# Patient Record
Sex: FEMALE | Race: WHITE | NOT HISPANIC OR LATINO | Employment: PART TIME | URBAN - METROPOLITAN AREA
[De-identification: names, ages, dates, MRNs, and addresses within clinical notes are randomized per-mention and may not be internally consistent; named-entity substitution may affect disease eponyms.]

---

## 2019-04-19 ENCOUNTER — OFFICE VISIT (OUTPATIENT)
Dept: INTERNAL MEDICINE | Facility: CLINIC | Age: 34
End: 2019-04-19

## 2019-04-19 VITALS
SYSTOLIC BLOOD PRESSURE: 120 MMHG | TEMPERATURE: 99 F | DIASTOLIC BLOOD PRESSURE: 62 MMHG | HEIGHT: 67 IN | WEIGHT: 129 LBS | BODY MASS INDEX: 20.25 KG/M2 | HEART RATE: 60 BPM

## 2019-04-19 DIAGNOSIS — J02.0 STREP PHARYNGITIS: Primary | ICD-10-CM

## 2019-04-19 PROCEDURE — 99213 OFFICE O/P EST LOW 20 MIN: CPT | Performed by: PHYSICIAN ASSISTANT

## 2019-04-19 RX ORDER — AZITHROMYCIN 250 MG/1
TABLET, FILM COATED ORAL
Qty: 6 TABLET | Refills: 0 | Status: SHIPPED | OUTPATIENT
Start: 2019-04-19 | End: 2019-05-16

## 2019-04-19 NOTE — PATIENT INSTRUCTIONS
Continue amoxicillin 875 mg twice a day.  If symptoms are not starting to improve or there is increased chest congestion in the next 2-3 days she will stop the amoxicillin and start the Z-Tony.  She was advised not to work until she has been afebrile for 24 hours.  She is to call next week if her symptoms are not resolving.

## 2019-04-19 NOTE — PROGRESS NOTES
"Patient Care Team:  David Zaldivar MD as PCP - General (Internal Medicine)    Chief Complaint;:   Chief Complaint   Patient presents with   • Sore Throat     Patient was diagnoised at [facility] Tuesday   • Cough     Productive   • Fever     100 last night   • Chills     Flu was negative        Subjective     HPI  34-year-old breast-feeding white female presents to the office today with persistent sore throat, nonproductive cough and chest congestion.  She was diagnosed 2 days ago with strep pharyngitis and given amoxicillin 875 mg twice a day.  She continues to run a fever and is still not feeling well.  History reviewed. No pertinent past medical history.    Social History     Socioeconomic History   • Marital status:      Spouse name: Not on file   • Number of children: Not on file   • Years of education: Not on file   • Highest education level: Not on file   Tobacco Use   • Smoking status: Never Smoker   Substance and Sexual Activity   • Alcohol use: No   • Drug use: No   • Sexual activity: Yes     Partners: Male       Allergies   Allergen Reactions   • Cefprozil Hives       Review of Systems:     Review of Systems   Constitutional: Negative.    HENT: Positive for congestion and sore throat.    Respiratory: Positive for cough.    Cardiovascular: Negative.        Vital Signs  Vitals:    04/19/19 1109   BP: 120/62   BP Location: Left arm   Patient Position: Sitting   Cuff Size: Adult   Pulse: 60   Temp: 99 °F (37.2 °C)   TempSrc: Temporal   Weight: 58.5 kg (129 lb)   Height: 170.2 cm (67\")         Current Outpatient Medications:   •  amoxicillin (AMOXIL) 875 MG tablet, Take 1 tablet by mouth 2 (Two) Times a Day., Disp: 20 tablet, Rfl: 0  •  Prenatal Vit-Fe Fumarate-FA (PRENATAL VITAMIN PO), Take  by mouth., Disp: , Rfl:   •  azithromycin (ZITHROMAX Z-TONJA) 250 MG tablet, Take 2 tablets the first day, then 1 tablet daily for 4 days., Disp: 6 tablet, Rfl: 0    Physical Exam:    Physical Exam   Constitutional: " She is oriented to person, place, and time. She appears well-developed and well-nourished.   HENT:   Head: Normocephalic and atraumatic.   Mouth/Throat: Oropharynx is clear and moist.   Neck: Normal range of motion. Neck supple.   Cardiovascular: Normal rate, regular rhythm and normal heart sounds.   Pulmonary/Chest: Effort normal and breath sounds normal.   Lymphadenopathy:     She has cervical adenopathy.   Neurological: She is alert and oriented to person, place, and time.   Nursing note and vitals reviewed.      Procedures      Assessment/Plan   Problem List Items Addressed This Visit     None      Visit Diagnoses     Strep pharyngitis    -  Primary    Relevant Medications    azithromycin (ZITHROMAX Z-TONJA) 250 MG tablet        Patient Instructions   Continue amoxicillin 875 mg twice a day.  If symptoms are not starting to improve or there is increased chest congestion in the next 2-3 days she will stop the amoxicillin and start the Z-Tonja.  She was advised not to work until she has been afebrile for 24 hours.  She is to call next week if her symptoms are not resolving.      Plan of care reviewed with patient at the conclusion of today's visit. Education was provided regarding diagnosis, management, and any prescribed or recommended OTC medications.Patient verbalizes understanding of and agreement with management plan.     Morenita Tomlin PA-C

## 2019-05-01 PROBLEM — B96.89 ACUTE BACTERIAL SINUSITIS: Status: ACTIVE | Noted: 2019-05-01

## 2019-05-01 PROBLEM — I47.9 PAROXYSMAL TACHYCARDIA, UNSPECIFIED: Status: ACTIVE | Noted: 2019-05-01

## 2019-05-01 PROBLEM — J01.90 ACUTE BACTERIAL SINUSITIS: Status: ACTIVE | Noted: 2019-05-01

## 2019-05-01 PROBLEM — J30.9 ALLERGIC RHINITIS: Status: ACTIVE | Noted: 2019-05-01

## 2019-05-01 PROBLEM — K58.9 IRRITABLE BOWEL SYNDROME: Status: ACTIVE | Noted: 2019-05-01

## 2019-05-01 PROBLEM — Z00.00 ANNUAL PHYSICAL EXAM: Status: ACTIVE | Noted: 2019-05-01

## 2019-05-16 ENCOUNTER — OFFICE VISIT (OUTPATIENT)
Dept: INTERNAL MEDICINE | Facility: CLINIC | Age: 34
End: 2019-05-16

## 2019-05-16 VITALS
WEIGHT: 124 LBS | HEART RATE: 72 BPM | DIASTOLIC BLOOD PRESSURE: 64 MMHG | BODY MASS INDEX: 19.46 KG/M2 | HEIGHT: 67 IN | SYSTOLIC BLOOD PRESSURE: 122 MMHG

## 2019-05-16 DIAGNOSIS — Z00.00 ANNUAL PHYSICAL EXAM: Primary | ICD-10-CM

## 2019-05-16 DIAGNOSIS — J30.1 SEASONAL ALLERGIC RHINITIS DUE TO POLLEN: ICD-10-CM

## 2019-05-16 DIAGNOSIS — Z13.228 SCREENING FOR ENDOCRINE, METABOLIC AND IMMUNITY DISORDER: ICD-10-CM

## 2019-05-16 DIAGNOSIS — Z13.0 SCREENING FOR ENDOCRINE, METABOLIC AND IMMUNITY DISORDER: ICD-10-CM

## 2019-05-16 DIAGNOSIS — Z13.29 SCREENING FOR ENDOCRINE, METABOLIC AND IMMUNITY DISORDER: ICD-10-CM

## 2019-05-16 DIAGNOSIS — K58.2 IRRITABLE BOWEL SYNDROME WITH BOTH CONSTIPATION AND DIARRHEA: ICD-10-CM

## 2019-05-16 DIAGNOSIS — Z13.220 LIPID SCREENING: ICD-10-CM

## 2019-05-16 PROCEDURE — 99395 PREV VISIT EST AGE 18-39: CPT | Performed by: INTERNAL MEDICINE

## 2019-05-16 NOTE — PROGRESS NOTES
[facility]     Latosha Romero  1985   [id number]      Patient Care Team:  David Zaldivar MD as PCP - General (Internal Medicine)    Chief Complaint::   Chief Complaint   Patient presents with   • Annual Exam        HPI  Latosha is now 34.  She comes in for annual examination.  She is now working part-time as a nurse at the [employer].  She has 2 children.  Other than fatigue, which she attributes to a  and an older child, which often results in loss of sleep, she has no complaints.  Her allergy symptoms are controlled and in general her IBS symptoms have been very well controlled.  She was followed closely through her most recent pregnancy by her obstetrician.  Her strength and stamina are good.    Chronic Conditions:      Patient Active Problem List   Diagnosis   • Irritable bowel syndrome   • Allergic rhinitis   • Annual physical exam   • Paroxysmal tachycardia, unspecified (CMS/HCC)        Past Medical History:   Diagnosis Date   • Scoliosis        Past Surgical History:   Procedure Laterality Date   • NOSE SURGERY     • TONSILLECTOMY         Family History   Problem Relation Age of Onset   • Depression Father    • Hypertension Father    • Hyperlipidemia Father    • Diabetes Paternal Grandfather        Social History     Socioeconomic History   • Marital status:      Spouse name: Not on file   • Number of children: Not on file   • Years of education: Not on file   • Highest education level: Not on file   Tobacco Use   • Smoking status: Never Smoker   Substance and Sexual Activity   • Alcohol use: No   • Drug use: No   • Sexual activity: Yes     Partners: Male       Allergies   Allergen Reactions   • Cefprozil Hives         Current Outpatient Medications:   •  Prenatal Vit-Fe Fumarate-FA (PRENATAL VITAMIN PO), Take  by mouth., Disp: , Rfl:     Immunization History   Administered Date(s) Administered   • Flu Vaccine Quad PF >36MO 10/18/2017, 10/16/2018   •  "Pneumococcal Polysaccharide (PPSV23) 09/28/2017        Health Maintenance Due   Topic Date Due   • TDAP/TD VACCINES (1 - Tdap) 01/23/2004   • PAP SMEAR  04/19/2017   • ANNUAL PHYSICAL  01/26/2019        Review of Systems   Constitutional: Negative for chills, fatigue and fever.   HENT: Negative for congestion, ear pain and sinus pressure.    Respiratory: Negative for cough, chest tightness, shortness of breath and wheezing.    Cardiovascular: Negative for chest pain and palpitations.   Gastrointestinal: Negative for abdominal pain, blood in stool and constipation.   Skin: Negative for color change.   Allergic/Immunologic: Negative for environmental allergies.   Neurological: Negative for dizziness, speech difficulty and headache.   Psychiatric/Behavioral: Negative for decreased concentration. The patient is not nervous/anxious.         Vital Signs  Vitals:    05/16/19 1417   BP: 122/64   BP Location: Left arm   Patient Position: Sitting   Cuff Size: Adult   Pulse: 72   Weight: 56.2 kg (124 lb)   Height: 170.2 cm (67.01\")       Physical Exam   Constitutional: She is oriented to person, place, and time. She appears well-developed and well-nourished.   HENT:   Head: Normocephalic and atraumatic.   Right Ear: External ear normal.   Left Ear: External ear normal.   Nose: Nose normal.   Mouth/Throat: Oropharynx is clear and moist. No oropharyngeal exudate.   Eyes: Conjunctivae and EOM are normal. Pupils are equal, round, and reactive to light.   Neck: Normal range of motion. Neck supple. No JVD present. No thyromegaly present.   Cardiovascular: Normal rate, regular rhythm, normal heart sounds and intact distal pulses. Exam reveals no gallop and no friction rub.   No murmur heard.  Pulmonary/Chest: Effort normal and breath sounds normal. No respiratory distress. She has no wheezes. She has no rales. She exhibits no tenderness.   Abdominal: Soft. Bowel sounds are normal. She exhibits no distension and no mass. There is no " tenderness. There is no rebound and no guarding. No hernia.   Musculoskeletal: Normal range of motion. She exhibits no edema or tenderness.   Lymphadenopathy:     She has no cervical adenopathy.   Neurological: She is alert and oriented to person, place, and time. She displays normal reflexes. No cranial nerve deficit or sensory deficit. She exhibits normal muscle tone. Coordination normal.   Skin: Skin is warm and dry. No rash noted. No erythema.   Psychiatric: She has a normal mood and affect. Her behavior is normal. Judgment and thought content normal.   Nursing note and vitals reviewed.     Procedures    ACE III MINI             Assessment/Plan:    Latosha was seen today for annual exam.    Diagnoses and all orders for this visit:    Annual physical exam    Lipid screening  -     Lipid Panel; Future    Screening for endocrine, metabolic and immunity disorder  -     Comprehensive Metabolic Panel; Future    Irritable bowel syndrome with both constipation and diarrhea    Seasonal allergic rhinitis due to pollen    She remains in excellent health with good lifestyle habits.  She is encouraged to continue working on maintaining physical strength and healthy diet.  She is up-to-date on GYN care.  She will return for lipid screening in the next few days and then will follow-up in the next year or 2 for a preventive examination.        Labs  Results for orders placed or performed during the hospital encounter of 04/16/19   POCT Influenza A/B   Result Value Ref Range    Rapid Influenza A Ag Negative Negative    Rapid Influenza B Ag Negative Negative    Internal Control Passed Passed    Lot Number [id number]     Expiration Date 04/30/2021    POCT Rapid Strep A   Result Value Ref Range    Rapid Strep A Screen Positive (A) Negative, VALID, INVALID, Not Performed    Internal Control Passed Passed    Lot Number [id number]     Expiration Date 10/14/2021         Counseling was given to patient for the following topics: appropriate  exercise 150 minutes per week, bone health including calcium and vitamin D, disease prevention and healthy eating habits.    Plan of care reviewed with patient at the conclusion of today's visit. Education was provided regarding diagnosis, management, and any prescribed or recommended OTC medications.Patient verbalizes understanding of and agreement with management plan.         David Zaldivar MD

## 2019-05-23 PROBLEM — J02.9 PHARYNGITIS: Status: ACTIVE | Noted: 2019-05-23

## 2019-09-30 PROBLEM — N61.0 MASTITIS, LEFT, ACUTE: Status: ACTIVE | Noted: 2019-09-30

## 2019-10-02 ENCOUNTER — TELEPHONE (OUTPATIENT)
Dept: INTERNAL MEDICINE | Facility: CLINIC | Age: 34
End: 2019-10-02

## 2019-10-02 NOTE — TELEPHONE ENCOUNTER
Pt verbalized understanding. She prefers to see you and she has an appt tomorrow morning with you @ 7:45am.

## 2019-10-02 NOTE — TELEPHONE ENCOUNTER
Pt called stating she is 8 mos post partem and has mastitis she started taking Augmentin on Monday and she has a fever running between 100- 101   Her ob office told her to follow up with her primary care provider   Wants to know if you think she should be on different antibiotic

## 2019-10-02 NOTE — TELEPHONE ENCOUNTER
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  Unfortunately, doxycycline and bactrim, which are the treatments, are used \"with caution\" in pregnancy.  If it a sensitive staph, it would respond to keflex, which is much safer.  It's very difficult to get a culture, particularly when she has already been on antibiotic.  Is there any way Morenita or Lynn could see her today?  "

## 2019-10-03 ENCOUNTER — OFFICE VISIT (OUTPATIENT)
Dept: INTERNAL MEDICINE | Facility: CLINIC | Age: 34
End: 2019-10-03

## 2019-10-03 ENCOUNTER — LAB (OUTPATIENT)
Dept: LAB | Facility: HOSPITAL | Age: 34
End: 2019-10-03

## 2019-10-03 VITALS
HEART RATE: 72 BPM | HEIGHT: 67 IN | WEIGHT: 113 LBS | SYSTOLIC BLOOD PRESSURE: 124 MMHG | DIASTOLIC BLOOD PRESSURE: 80 MMHG | BODY MASS INDEX: 17.74 KG/M2 | TEMPERATURE: 98.8 F

## 2019-10-03 DIAGNOSIS — N61.0 MASTITIS: ICD-10-CM

## 2019-10-03 DIAGNOSIS — Z13.0 SCREENING FOR ENDOCRINE, METABOLIC AND IMMUNITY DISORDER: ICD-10-CM

## 2019-10-03 DIAGNOSIS — Z13.220 LIPID SCREENING: ICD-10-CM

## 2019-10-03 DIAGNOSIS — N61.0 MASTITIS: Primary | ICD-10-CM

## 2019-10-03 DIAGNOSIS — Z13.228 SCREENING FOR ENDOCRINE, METABOLIC AND IMMUNITY DISORDER: ICD-10-CM

## 2019-10-03 DIAGNOSIS — Z13.29 SCREENING FOR ENDOCRINE, METABOLIC AND IMMUNITY DISORDER: ICD-10-CM

## 2019-10-03 LAB
ALBUMIN SERPL-MCNC: 4.5 G/DL (ref 3.5–5.2)
ALBUMIN/GLOB SERPL: 1.7 G/DL
ALP SERPL-CCNC: 90 U/L (ref 39–117)
ALT SERPL W P-5'-P-CCNC: 14 U/L (ref 1–33)
ANION GAP SERPL CALCULATED.3IONS-SCNC: 15.8 MMOL/L (ref 5–15)
AST SERPL-CCNC: 16 U/L (ref 1–32)
BASOPHILS # BLD MANUAL: 0.06 10*3/MM3 (ref 0–0.2)
BASOPHILS NFR BLD AUTO: 1 % (ref 0–1.5)
BILIRUB SERPL-MCNC: 0.3 MG/DL (ref 0.2–1.2)
BUN BLD-MCNC: 12 MG/DL (ref 6–20)
BUN/CREAT SERPL: 19.7 (ref 7–25)
CALCIUM SPEC-SCNC: 9.6 MG/DL (ref 8.6–10.5)
CHLORIDE SERPL-SCNC: 106 MMOL/L (ref 98–107)
CHOLEST SERPL-MCNC: 125 MG/DL (ref 0–200)
CO2 SERPL-SCNC: 26.2 MMOL/L (ref 22–29)
CREAT BLD-MCNC: 0.61 MG/DL (ref 0.57–1)
DEPRECATED RDW RBC AUTO: 39.8 FL (ref 37–54)
EOSINOPHIL # BLD MANUAL: 0.29 10*3/MM3 (ref 0–0.4)
EOSINOPHIL NFR BLD MANUAL: 5 % (ref 0.3–6.2)
ERYTHROCYTE [DISTWIDTH] IN BLOOD BY AUTOMATED COUNT: 12.6 % (ref 12.3–15.4)
GFR SERPL CREATININE-BSD FRML MDRD: 112 ML/MIN/1.73
GIANT PLATELETS: NORMAL
GLOBULIN UR ELPH-MCNC: 2.7 GM/DL
GLUCOSE BLD-MCNC: 79 MG/DL (ref 65–99)
HCT VFR BLD AUTO: 36.7 % (ref 34–46.6)
HDLC SERPL-MCNC: 52 MG/DL (ref 40–60)
HGB BLD-MCNC: 12.1 G/DL (ref 12–15.9)
LDLC SERPL CALC-MCNC: 62 MG/DL (ref 0–100)
LDLC/HDLC SERPL: 1.18 {RATIO}
LYMPHOCYTES # BLD MANUAL: 1.31 10*3/MM3 (ref 0.7–3.1)
LYMPHOCYTES NFR BLD MANUAL: 22.8 % (ref 19.6–45.3)
LYMPHOCYTES NFR BLD MANUAL: 5 % (ref 5–12)
MCH RBC QN AUTO: 28.6 PG (ref 26.6–33)
MCHC RBC AUTO-ENTMCNC: 33 G/DL (ref 31.5–35.7)
MCV RBC AUTO: 86.8 FL (ref 79–97)
MONOCYTES # BLD AUTO: 0.29 10*3/MM3 (ref 0.1–0.9)
NEUTROPHILS # BLD AUTO: 3.82 10*3/MM3 (ref 1.7–7)
NEUTROPHILS NFR BLD MANUAL: 66.3 % (ref 42.7–76)
PLATELET # BLD AUTO: 212 10*3/MM3 (ref 140–450)
PMV BLD AUTO: 10.4 FL (ref 6–12)
POTASSIUM BLD-SCNC: 3.9 MMOL/L (ref 3.5–5.2)
PROT SERPL-MCNC: 7.2 G/DL (ref 6–8.5)
RBC # BLD AUTO: 4.23 10*6/MM3 (ref 3.77–5.28)
RBC MORPH BLD: NORMAL
SODIUM BLD-SCNC: 148 MMOL/L (ref 136–145)
TRIGL SERPL-MCNC: 57 MG/DL (ref 0–150)
VLDLC SERPL-MCNC: 11.4 MG/DL (ref 5–40)
WBC MORPH BLD: NORMAL
WBC NRBC COR # BLD: 5.76 10*3/MM3 (ref 3.4–10.8)

## 2019-10-03 PROCEDURE — 99213 OFFICE O/P EST LOW 20 MIN: CPT | Performed by: INTERNAL MEDICINE

## 2019-10-03 PROCEDURE — 85007 BL SMEAR W/DIFF WBC COUNT: CPT

## 2019-10-03 PROCEDURE — 80061 LIPID PANEL: CPT

## 2019-10-03 PROCEDURE — 80053 COMPREHEN METABOLIC PANEL: CPT

## 2019-10-03 PROCEDURE — 85025 COMPLETE CBC W/AUTO DIFF WBC: CPT

## 2019-10-03 NOTE — PROGRESS NOTES
[facility]     Latosha Romero  1985   [id number]      Patient Care Team:  David Zaldivar MD as PCP - General (Internal Medicine)    Chief Complaint::   Chief Complaint   Patient presents with   • mastitis        HPI  Mrs Romero comes in with a 4-day history of breast pain.  She was seen on the 30th at urgent care and diagnosed with mastitis.  She was placed on Augmentin.  She has had significant pain and tenderness in the left breast and axilla.  This began with a blister on the nipple where she thinks her son who, who is now 8 months bit her.  Monday and Tuesday she had fever and did not get out of bed.  Yesterday she felt somewhat better.  This morning she still feels tired but has no fever.  She is breast-feeding and pumping, previously producing more milk than needed and donating some.  For the past few days however the milk production from the left breast has reduced from 6 to 8 ounces to no more than 2 ounces.    Chronic Conditions:      Patient Active Problem List   Diagnosis   • Irritable bowel syndrome   • Allergic rhinitis   • Annual physical exam   • Paroxysmal tachycardia, unspecified (CMS/HCC)   • Pharyngitis   • Mastitis, left, acute        Past Medical History:   Diagnosis Date   • Scoliosis        Past Surgical History:   Procedure Laterality Date   • NOSE SURGERY  2004   • TONSILLECTOMY  2003       Family History   Problem Relation Age of Onset   • Depression Father    • Hypertension Father    • Hyperlipidemia Father    • Diabetes Paternal Grandfather        Social History     Socioeconomic History   • Marital status:      Spouse name: Not on file   • Number of children: Not on file   • Years of education: Not on file   • Highest education level: Not on file   Tobacco Use   • Smoking status: Never Smoker   Substance and Sexual Activity   • Alcohol use: No   • Drug use: No   • Sexual activity: Yes     Partners: Male       Allergies   Allergen Reactions   • Cefzil  "[Cefprozil] Hives         Current Outpatient Medications:   •  Prenatal Vit-Fe Fumarate-FA (PRENATAL VITAMIN PO), Take  by mouth., Disp: , Rfl:   •  dicloxacillin (DYNAPEN) 500 MG capsule, Take 1 capsule by mouth 4 (Four) Times a Day., Disp: 28 capsule, Rfl: 0    Review of Systems   Constitutional: Negative for chills, fatigue and fever.   HENT: Negative for congestion, ear pain and sinus pressure.    Respiratory: Negative for cough, chest tightness, shortness of breath and wheezing.    Cardiovascular: Negative for chest pain and palpitations.   Gastrointestinal: Negative for abdominal pain, blood in stool and constipation.   Skin: Negative for color change.   Allergic/Immunologic: Negative for environmental allergies.   Neurological: Negative for dizziness, speech difficulty and headache.   Psychiatric/Behavioral: Negative for decreased concentration. The patient is not nervous/anxious.         Vital Signs  Vitals:    10/03/19 0744   BP: 124/80   BP Location: Left arm   Patient Position: Sitting   Cuff Size: Adult   Pulse: 72   Temp: 98.8 °F (37.1 °C)   Weight: 51.3 kg (113 lb)   Height: 170.2 cm (67.01\")   PainSc:   4   PainLoc: Breast       Physical Exam   Constitutional: She appears well-developed and well-nourished. No distress.   Pulmonary/Chest:   There is some cracking of the skin on the left nipple there is tenderness to palpation on the lateral aspect of the breast extending to the axilla.  There is no axillary adenopathy or tenderness.  No mass or fluctuance is appreciated.   Skin: Skin is warm and dry.      Procedures    ACE III MINI             Assessment/Plan:    Latosha was seen today for mastitis.    Diagnoses and all orders for this visit:    Mastitis  -     CBC & Differential; Future    Other orders  -     dicloxacillin (DYNAPEN) 500 MG capsule; Take 1 capsule by mouth 4 (Four) Times a Day.    Plan    In the setting of lactation, the offending organism is likely staph.  When she called yesterday she " felt that she was not responding to Augmentin, states she feels better so even though Augmentin is not the drug of choice here she have had some response.  However I think at this point it is preferable to switch to dicloxacillin for MSSA.  She does work in a hospital as a nurse, but the fact that she has had a partial response to Augmentin argues against MRSA.  She will let me know if she worsens after the change of therapy.      Plan of care reviewed with patient at the conclusion of today's visit. Education was provided regarding diagnosis, management, and any prescribed or recommended OTC medications.Patient verbalizes understanding of and agreement with management plan.         David Zaldivar MD

## 2019-11-11 ENCOUNTER — TELEPHONE (OUTPATIENT)
Dept: INTERNAL MEDICINE | Facility: CLINIC | Age: 34
End: 2019-11-11

## 2019-11-12 NOTE — TELEPHONE ENCOUNTER
Recurrent mastitis with fever of 101 previously treated effectively with dicloxacillin 500 mg 4 times a day, [facility] at 9 PM.

## 2019-11-12 NOTE — TELEPHONE ENCOUNTER
Called and talked with pt. She is a little better today, fever down to 100. Advised to keep us informed. She also wanted to let Dr. Zaldivar know her mom had her mastectomy this am and is doing ok

## 2019-11-18 ENCOUNTER — TELEPHONE (OUTPATIENT)
Dept: INTERNAL MEDICINE | Facility: CLINIC | Age: 34
End: 2019-11-18

## 2019-11-18 NOTE — TELEPHONE ENCOUNTER
I typically try to use 7 day course most of the time.  If her breast symptoms have resolved, and the fever has as well, probably no need for any more treatment.  How is her mom?

## 2019-11-18 NOTE — TELEPHONE ENCOUNTER
Patient had mastitis last week. Patient began to feel better but yesterday ran a fever of 101 and had a sore throat to boot. Patient is unsure if the two issues correlated or if it might be something else.  Patient mentioned she would prefer not to go to a urgent treatment center and because she feels that Dr. Zaldivar has the best understanding of the situation.     Please give a return call and advise.

## 2019-11-18 NOTE — TELEPHONE ENCOUNTER
She stated her breast is not inflamed nor tender. She did have a fever last @ 101. She took 3 advil. This morning her fever was gone. She took the last dose of her abx this morning. She stated last Tuesday her mom had a mastectomy and she stayed overnight at the hospital with her and Thursday she lost her voice and now has a raspy voice with some drainage running down her throat but does not complain of any other URI symptoms. She states when she is on an abx she normally takes it for 10 days but the rx that was sent in by [facility] was for 7 days. Please advise.

## 2019-11-18 NOTE — TELEPHONE ENCOUNTER
Called and spoke to pt. She has been afebrile today, but just concerned because she was with her mother yesterday. She said they typically do 7-10 days of antibiotics, but is ok if Dr. Zaldivar doesn't feel she needs any additional. Her mom is doing as well as can be expected - very tired

## 2019-11-22 ENCOUNTER — TELEPHONE (OUTPATIENT)
Dept: INTERNAL MEDICINE | Facility: CLINIC | Age: 34
End: 2019-11-22

## 2019-11-22 ENCOUNTER — OFFICE VISIT (OUTPATIENT)
Dept: INTERNAL MEDICINE | Facility: CLINIC | Age: 34
End: 2019-11-22

## 2019-11-22 VITALS
SYSTOLIC BLOOD PRESSURE: 142 MMHG | TEMPERATURE: 100.8 F | WEIGHT: 113 LBS | BODY MASS INDEX: 17.74 KG/M2 | DIASTOLIC BLOOD PRESSURE: 70 MMHG | HEART RATE: 100 BPM | HEIGHT: 67 IN

## 2019-11-22 DIAGNOSIS — J06.9 VIRAL URI: Primary | ICD-10-CM

## 2019-11-22 DIAGNOSIS — R50.81 FEVER IN OTHER DISEASES: ICD-10-CM

## 2019-11-22 LAB
EXPIRATION DATE: NORMAL
EXPIRATION DATE: NORMAL
FLUAV AG NPH QL: NEGATIVE
FLUBV AG NPH QL: NEGATIVE
INTERNAL CONTROL: NORMAL
INTERNAL CONTROL: NORMAL
Lab: NORMAL
Lab: NORMAL
S PYO AG THROAT QL: NEGATIVE

## 2019-11-22 PROCEDURE — 99213 OFFICE O/P EST LOW 20 MIN: CPT | Performed by: INTERNAL MEDICINE

## 2019-11-22 PROCEDURE — 87502 INFLUENZA DNA AMP PROBE: CPT | Performed by: INTERNAL MEDICINE

## 2019-11-22 PROCEDURE — 87561 M.AVIUM-INTRA DNA AMP PROB: CPT | Performed by: INTERNAL MEDICINE

## 2019-11-22 RX ORDER — AMOXICILLIN 500 MG/1
500 CAPSULE ORAL 2 TIMES DAILY
Qty: 14 CAPSULE | Refills: 0 | Status: SHIPPED | OUTPATIENT
Start: 2019-11-22 | End: 2020-06-15

## 2019-11-22 NOTE — PROGRESS NOTES
[facility]     Latosha Romero  1985   [id number]      Patient Care Team:  David Zaldivar MD as PCP - General (Internal Medicine)    Chief Complaint::   Chief Complaint   Patient presents with   • Fever   • Sore Throat     throat drainage        HPI  Mrs. Romero developed a fever of over 100 last night and a sore throat.  She has congestion.  She has not had body aches and like she has the fever.  After she treats it it resolves.  Last Sunday while with her mother she developed a low-grade fever and had a sore throat but that resolved pretty quickly.  We have previously treated her for mastitis and at one point she was concerned that that had recurred but she notes that her breast does not show any signs of infection currently.  There is no shortness of breath, cough, or purulent drainage.  Her oldest child,  and father all have GI illness with diarrhea.  Her mother is in the hospital recovering from mastectomies.    Chronic Conditions:      Patient Active Problem List   Diagnosis   • Irritable bowel syndrome   • Allergic rhinitis   • Annual physical exam   • Paroxysmal tachycardia, unspecified (CMS/HCC)   • Pharyngitis   • Mastitis, left, acute        Past Medical History:   Diagnosis Date   • Scoliosis        Past Surgical History:   Procedure Laterality Date   • NOSE SURGERY  2004   • TONSILLECTOMY  2003       Family History   Problem Relation Age of Onset   • Depression Father    • Hypertension Father    • Hyperlipidemia Father    • Diabetes Paternal Grandfather        Social History     Socioeconomic History   • Marital status:      Spouse name: Not on file   • Number of children: Not on file   • Years of education: Not on file   • Highest education level: Not on file   Tobacco Use   • Smoking status: Never Smoker   • Smokeless tobacco: Never Used   Substance and Sexual Activity   • Alcohol use: No   • Drug use: No   • Sexual activity: Yes     Partners: Male  "      Allergies   Allergen Reactions   • Cefzil [Cefprozil] Hives         Current Outpatient Medications:   •  Prenatal Vit-Fe Fumarate-FA (PRENATAL VITAMIN PO), Take  by mouth., Disp: , Rfl:   •  amoxicillin (AMOXIL) 500 MG capsule, Take 1 capsule by mouth 2 (Two) Times a Day., Disp: 14 capsule, Rfl: 0    Review of Systems   Constitutional: Positive for chills, fatigue and fever.   HENT: Positive for ear pain and sore throat. Negative for congestion and sinus pressure.    Respiratory: Negative for cough, chest tightness, shortness of breath and wheezing.    Cardiovascular: Negative for chest pain and palpitations.   Gastrointestinal: Negative for abdominal pain, blood in stool and constipation.   Skin: Negative for color change.   Allergic/Immunologic: Negative for environmental allergies.   Neurological: Negative for dizziness, speech difficulty and headache.   Psychiatric/Behavioral: Negative for decreased concentration. The patient is not nervous/anxious.         Vital Signs  Vitals:    11/22/19 1413   BP: 142/70   BP Location: Right arm   Patient Position: Sitting   Cuff Size: Adult   Pulse: 100   Temp: (!) 100.8 °F (38.2 °C)   TempSrc: Temporal   Weight: 51.3 kg (113 lb)   Height: 170.2 cm (67.01\")   PainSc: 0-No pain       Physical Exam   Constitutional: She is oriented to person, place, and time. She appears well-developed and well-nourished.   HENT:   Head: Normocephalic and atraumatic.   Right Ear: Tympanic membrane normal.   Left Ear: Tympanic membrane normal.   Mouth/Throat: Oropharynx is clear and moist. No oropharyngeal exudate or posterior oropharyngeal erythema.   Neck: Normal range of motion. Neck supple.   Cardiovascular: Normal rate, regular rhythm and normal heart sounds.   No murmur heard.  Pulmonary/Chest: Effort normal and breath sounds normal.   Lymphadenopathy:     She has no cervical adenopathy.   Neurological: She is alert and oriented to person, place, and time.   Psychiatric: She has a " normal mood and affect.   Vitals reviewed.     Procedures    ACE III MINI             Assessment/Plan:    Latosha was seen today for fever and sore throat.    Diagnoses and all orders for this visit:    Viral URI    Fever in other diseases  -     POCT rapid strep A  -     POCT Influenza A/B    Other orders  -     amoxicillin (AMOXIL) 500 MG capsule; Take 1 capsule by mouth 2 (Two) Times a Day.    Plan    Strep and flu screens are negative, this is clearly viral.  I have given her amoxicillin to take only if her symptoms progress over the weekend but she knows not to treat a viral illness with antibiotics.  The treatment remains symptomatic.      Plan of care reviewed with patient at the conclusion of today's visit. Education was provided regarding diagnosis, management, and any prescribed or recommended OTC medications.Patient verbalizes understanding of and agreement with management plan.         David Zaldivar MD

## 2019-11-22 NOTE — TELEPHONE ENCOUNTER
Pharmacy calling to request change of amoxicillin tablets instead of the capsules due to being out of stock pharmacy [address] [facility]

## 2020-06-15 ENCOUNTER — OFFICE VISIT (OUTPATIENT)
Dept: INTERNAL MEDICINE | Facility: CLINIC | Age: 35
End: 2020-06-15

## 2020-06-15 VITALS
SYSTOLIC BLOOD PRESSURE: 132 MMHG | WEIGHT: 121.6 LBS | TEMPERATURE: 97.7 F | DIASTOLIC BLOOD PRESSURE: 70 MMHG | HEART RATE: 76 BPM | HEIGHT: 67 IN | BODY MASS INDEX: 19.09 KG/M2

## 2020-06-15 DIAGNOSIS — J30.1 SEASONAL ALLERGIC RHINITIS DUE TO POLLEN: ICD-10-CM

## 2020-06-15 DIAGNOSIS — Z00.00 ANNUAL PHYSICAL EXAM: Primary | ICD-10-CM

## 2020-06-15 PROCEDURE — 99395 PREV VISIT EST AGE 18-39: CPT | Performed by: INTERNAL MEDICINE

## 2020-06-15 RX ORDER — CETIRIZINE HYDROCHLORIDE 10 MG/1
10 TABLET ORAL DAILY PRN
COMMUNITY

## 2020-06-15 NOTE — PROGRESS NOTES
[facility]     Latosha Romero  1985   [id number]      Patient Care Team:  David Zaldivar MD as PCP - General (Internal Medicine)    Chief Complaint::   Chief Complaint   Patient presents with   • Annual Exam        HPI  Mrs. Romero is now 35.  She comes in for follow-up of her allergic rhinitis and for annual examination.  She feels well and has no complaints other than being tired from taking care of 2 children.  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  She is concerned by the fact that she is gained some weight since she stopped breast-feeding her youngest child.  There is no fever, cough, shortness of breath or chest pain.    Chronic Conditions:      Patient Active Problem List   Diagnosis   • Irritable bowel syndrome   • Allergic rhinitis   • Annual physical exam   • Paroxysmal tachycardia, unspecified (CMS/HCC)   • Pharyngitis        Past Medical History:   Diagnosis Date   • Mastitis, left, acute 2019   • Scoliosis        Past Surgical History:   Procedure Laterality Date   • NOSE SURGERY     • TONSILLECTOMY         Family History   Problem Relation Age of Onset   • Depression Father    • Hypertension Father    • Hyperlipidemia Father    • Diabetes Paternal Grandfather        Social History     Socioeconomic History   • Marital status:      Spouse name: Not on file   • Number of children: Not on file   • Years of education: Not on file   • Highest education level: Not on file   Tobacco Use   • Smoking status: Never Smoker   • Smokeless tobacco: Never Used   Substance and Sexual Activity   • Alcohol use: No   • Drug use: No   • Sexual activity: Yes     Partners: Male       Allergies   Allergen Reactions   • Cefzil [Cefprozil] Hives         Current Outpatient Medications:   •  cetirizine (zyrTEC) 10 MG tablet, Take 10 mg by mouth Daily As Needed for Allergies., Disp: , Rfl:  "  •  Prenatal Vit-Fe Fumarate-FA (PRENATAL VITAMIN PO), Take  by mouth., Disp: , Rfl:     Immunization History   Administered Date(s) Administered   • Flu Vaccine Quad PF >36MO 10/18/2017, 10/16/2018, 10/15/2019   • Pneumococcal Polysaccharide (PPSV23) 09/28/2017        Health Maintenance Due   Topic Date Due   • TDAP/TD VACCINES (1 - Tdap) 01/23/1996   • HEPATITIS C SCREENING  04/19/2017   • PAP SMEAR  04/19/2017   • ANNUAL PHYSICAL  05/17/2020        Review of Systems   Constitutional: Negative for chills, fatigue and fever.   HENT: Negative for congestion, ear pain and sinus pressure.    Respiratory: Negative for cough, chest tightness, shortness of breath and wheezing.    Cardiovascular: Negative for chest pain and palpitations.   Gastrointestinal: Negative for abdominal pain, blood in stool and constipation.   Skin: Negative for color change.   Allergic/Immunologic: Negative for environmental allergies.   Neurological: Negative for dizziness, speech difficulty and headache.   Psychiatric/Behavioral: Negative for decreased concentration. The patient is not nervous/anxious.         Vital Signs  Vitals:    06/15/20 1538   BP: 132/70   BP Location: Left arm   Patient Position: Sitting   Cuff Size: Adult   Pulse: 76   Temp: 97.7 °F (36.5 °C)   Weight: 55.2 kg (121 lb 9.6 oz)   Height: 170.2 cm (67.01\")   PainSc: 0-No pain       Physical Exam   Constitutional: She is oriented to person, place, and time. She appears well-developed and well-nourished.   HENT:   Head: Normocephalic and atraumatic.   Right Ear: External ear normal.   Left Ear: External ear normal.   Nose: Nose normal.   Mouth/Throat: Oropharynx is clear and moist. No oropharyngeal exudate.   Eyes: Pupils are equal, round, and reactive to light. Conjunctivae and EOM are normal.   Neck: Normal range of motion. Neck supple. No JVD present. No thyromegaly present.   Cardiovascular: Normal rate, regular rhythm, normal heart sounds and intact distal pulses. " Exam reveals no gallop and no friction rub.   No murmur heard.  Pulmonary/Chest: Effort normal and breath sounds normal. No respiratory distress. She has no wheezes. She has no rales.   Abdominal: Soft. Bowel sounds are normal. She exhibits no distension and no mass. There is no tenderness. There is no rebound and no guarding. No hernia.   Musculoskeletal: Normal range of motion. She exhibits no edema or tenderness.   Lymphadenopathy:     She has no cervical adenopathy.   Neurological: She is alert and oriented to person, place, and time. She displays normal reflexes. No cranial nerve deficit or sensory deficit. She exhibits normal muscle tone. Coordination normal.   Skin: Skin is warm and dry. No rash noted. No erythema.   Psychiatric: She has a normal mood and affect. Her behavior is normal. Judgment and thought content normal.   Nursing note and vitals reviewed.     Procedures     Fall Risk Screen:  STEADI Fall Risk Assessment has not been completed.    Health Habits and Functional and Cognitive Screening:  No flowsheet data found.    Depression Sreening  PHQ-9 Total Score:       ACE III MINI             Assessment/Plan:    Latosha was seen today for annual exam.    Diagnoses and all orders for this visit:    Annual physical exam    Seasonal allergic rhinitis due to pollen    Plan    She remains in excellent health with good lifestyle habits.  Given her BMI of 19, it is difficult for me to be concerned about her weight.  She is up-to-date on GYN care.    Allergies are well controlled by cetirizine.        Labs  Results for orders placed or performed in visit on 11/22/19   POCT rapid strep A   Result Value Ref Range    Rapid Strep A Screen Negative Negative, VALID, INVALID, Not Performed    Internal Control Passed Passed    Lot Number [id number]     Expiration Date 06/30/2020    POCT Influenza A/B   Result Value Ref Range    Rapid Influenza A Ag Negative Negative    Rapid Influenza B Ag Negative Negative    Internal  Control Passed Passed    Lot Number [id number]     Expiration Date 03/28/2020         Counseling was given to patient for the following topics: appropriate exercise 150 minutes/week, disease prevention and healthy eating habits.    Plan of care reviewed with patient at the conclusion of today's visit. Education was provided regarding diagnosis, management, and any prescribed or recommended OTC medications.Patient verbalizes understanding of and agreement with management plan.         David Zaldivar MD

## 2020-06-16 PROBLEM — N61.0 MASTITIS, LEFT, ACUTE: Status: RESOLVED | Noted: 2019-09-30 | Resolved: 2020-06-16

## 2021-01-14 ENCOUNTER — TELEPHONE (OUTPATIENT)
Dept: INTERNAL MEDICINE | Facility: CLINIC | Age: 36
End: 2021-01-14

## 2021-01-14 ENCOUNTER — HOSPITAL ENCOUNTER (OUTPATIENT)
Dept: GENERAL RADIOLOGY | Facility: HOSPITAL | Age: 36
Discharge: HOME | End: 2021-01-14
Admitting: NURSE PRACTITIONER

## 2021-01-14 ENCOUNTER — OFFICE VISIT (OUTPATIENT)
Dept: INTERNAL MEDICINE | Facility: CLINIC | Age: 36
End: 2021-01-14

## 2021-01-14 VITALS
DIASTOLIC BLOOD PRESSURE: 68 MMHG | HEIGHT: 67 IN | TEMPERATURE: 97.4 F | WEIGHT: 124 LBS | HEART RATE: 80 BPM | BODY MASS INDEX: 19.46 KG/M2 | SYSTOLIC BLOOD PRESSURE: 142 MMHG

## 2021-01-14 DIAGNOSIS — R30.0 DYSURIA: Primary | ICD-10-CM

## 2021-01-14 DIAGNOSIS — R10.30 LOWER ABDOMINAL PAIN: ICD-10-CM

## 2021-01-14 DIAGNOSIS — K58.0 IRRITABLE BOWEL SYNDROME WITH DIARRHEA: ICD-10-CM

## 2021-01-14 LAB
BILIRUB BLD-MCNC: NEGATIVE MG/DL
CLARITY, POC: CLEAR
COLOR UR: YELLOW
GLUCOSE UR STRIP-MCNC: NEGATIVE MG/DL
KETONES UR QL: NEGATIVE
LEUKOCYTE EST, POC: NEGATIVE
NITRITE UR-MCNC: NEGATIVE MG/ML
PH UR: 5.5 [PH] (ref 5–8)
PROT UR STRIP-MCNC: NEGATIVE MG/DL
RBC # UR STRIP: NEGATIVE /UL
SP GR UR: 1.03 (ref 1–1.03)
UROBILINOGEN UR QL: NORMAL

## 2021-01-14 PROCEDURE — 81003 URINALYSIS AUTO W/O SCOPE: CPT | Performed by: NURSE PRACTITIONER

## 2021-01-14 PROCEDURE — 74018 RADEX ABDOMEN 1 VIEW: CPT

## 2021-01-14 PROCEDURE — 99213 OFFICE O/P EST LOW 20 MIN: CPT | Performed by: NURSE PRACTITIONER

## 2021-01-14 RX ORDER — NITROFURANTOIN 25; 75 MG/1; MG/1
100 CAPSULE ORAL 2 TIMES DAILY
Qty: 10 CAPSULE | Refills: 0 | Status: SHIPPED | OUTPATIENT
Start: 2021-01-14 | End: 2021-01-19

## 2021-01-14 NOTE — PROGRESS NOTES
"  Follow Up Office Visit      Patient Name: Latosha Romero  : 1985   MRN: [id number]   Care Team: Patient Care Team:  David Zaldivar MD as PCP - General (Internal Medicine)    Chief Complaint:    Chief Complaint   Patient presents with   • Abdominal Pain   • Difficulty Urinating   • Urinary Urgency       History of Present Illness: Latosha Romero is a 35 y.o. female with pertinent medical history significant for IBS-D, otherwise primarily healthy.  She presents today with concern of lower abdominal pain with urinary urgency and difficulty with urinating.  Symptoms present for about 5 days but have been waxing and waning.  Reports she had a normal menstrual cycle 1 week ago, symptoms started on last day of period.  Described as fullness sensation in the lower abdomen, \"like how I feel just before my period starts\".  Admits she has never had a bladder infection but thought symptoms may be related to this, so she drank some cranberry juice.  Reports she feels symptoms were somewhat improved after that but returned a few days later.  Reports that she had one episode of urinary urgency but was unable to urinate more than a \"few drops of urine\".  Denies any visible blood in the urine, no chills or fever, no associated back pain.    Denies any issues of vaginal discharge.  Denies any concerns for STIs.  Admits she has not had intercourse in prior part of her last menstrual cycle.  She is in a monogamous relationship with her spouse.  Reports her IBS-D has been stable lately and normal bowel movements over the last week or so.    Last gynecological exam was summer 2019.  She was scheduled to see her OB/GYN in summer 2020 but canceled this due to the pandemic.    Of note, reports she had her first COVID-19 vaccine within last 2 weeks.       Subjective      Review of Systems:   Review of Systems   Constitutional: Negative for chills, fatigue and fever.   HENT: Negative for congestion, ear pain and sinus " "pressure.    Respiratory: Negative for cough, chest tightness, shortness of breath and wheezing.    Cardiovascular: Negative for chest pain and palpitations.   Gastrointestinal: Positive for abdominal pain. Negative for blood in stool, constipation, diarrhea, nausea and vomiting.   Genitourinary: Positive for difficulty urinating, dysuria and frequency. Negative for flank pain, genital sores, menstrual problem, vaginal discharge and vaginal pain.   Skin: Negative for color change.   Allergic/Immunologic: Negative for environmental allergies.   Neurological: Negative for dizziness, speech difficulty and headache.   Psychiatric/Behavioral: Negative for decreased concentration. The patient is not nervous/anxious.        I have reviewed and the following portions of the patient's history were updated as appropriate: past family history, past medical history, past social history, past surgical history and problem list.    Medications:     Current Outpatient Medications:   •  cetirizine (zyrTEC) 10 MG tablet, Take 10 mg by mouth Daily As Needed for Allergies., Disp: , Rfl:   •  Prenatal Vit-Fe Fumarate-FA (PRENATAL VITAMIN PO), Take 1 tablet by mouth Daily., Disp: , Rfl:   •  nitrofurantoin, macrocrystal-monohydrate, (Macrobid) 100 MG capsule, Take 1 capsule by mouth 2 (Two) Times a Day for 5 days., Disp: 10 capsule, Rfl: 0    Allergies:   Allergies   Allergen Reactions   • Cefzil [Cefprozil] Hives       Objective     Physical Exam:  Vital Signs:   Vitals:    01/14/21 0854   BP: 142/68   BP Location: Left arm   Patient Position: Sitting   Cuff Size: Adult   Pulse: 80   Temp: 97.4 °F (36.3 °C)   TempSrc: Temporal   Weight: 56.2 kg (124 lb)   Height: 170.2 cm (67.01\")   PainSc:   5   PainLoc: Abdomen     Body mass index is 19.42 kg/m².     Physical Exam  Vitals signs and nursing note reviewed.   Constitutional:       General: She is not in acute distress.  HENT:      Head: Normocephalic and atraumatic.      Mouth/Throat:    "   Mouth: Mucous membranes are moist.      Pharynx: Oropharynx is clear.   Eyes:      General: No scleral icterus.     Pupils: Pupils are equal, round, and reactive to light.   Neck:      Musculoskeletal: Normal range of motion and neck supple. No muscular tenderness.   Cardiovascular:      Rate and Rhythm: Normal rate and regular rhythm.   Pulmonary:      Effort: Pulmonary effort is normal. No respiratory distress.      Breath sounds: Normal breath sounds. No wheezing or rhonchi.   Abdominal:      General: Abdomen is flat. Bowel sounds are normal. There is no distension.      Palpations: Abdomen is soft. There is no mass.      Tenderness: There is abdominal tenderness (tenderness noted to palpation in mid-lower abdomen/pelvic area.  there is no rebound noted, however.). There is no right CVA tenderness, left CVA tenderness, guarding or rebound.   Musculoskeletal: Normal range of motion.   Skin:     General: Skin is warm and dry.   Neurological:      General: No focal deficit present.      Mental Status: She is alert and oriented to person, place, and time.   Psychiatric:         Mood and Affect: Mood normal.         Thought Content: Thought content normal.         Judgment: Judgment normal.         Assessment / Plan      Assessment/Plan:   Problems Addressed This Visit    ICD-10-CM ICD-9-CM   1. Dysuria  R30.0 788.1   2. Lower abdominal pain  R10.30 789.09   3. Irritable bowel syndrome with diarrhea  K58.0 564.1     Dysuria  Lower abdominal pain  - U/A appears clear, no culture ordered  - KUB ordered to rule out renal calculi or obstructive pathology  - ? Inflammatory response with recent menstrual cycle  - trial of NSAIDs such as ibuprofen 400mg BID today with start of macrobid 100mg BID x 5 days as precaution with symptoms  - further instructions pending results of KUB    IBS  - patient reports symptoms stable at this time, has had normal BMs over the past week, last BM this morning.    Plan of care reviewed with  patient at the conclusion of today's visit. Education was provided regarding diagnosis and management.  Patient verbalizes understanding of and agreement with management plan.    There are no Patient Instructions on file for this visit.    Follow Up:   Return if symptoms worsen or fail to improve.    EMILI Keys  [facility] [address] [facility]    Please note that portions of this note may have been completed with a voice recognition program. Efforts were made to edit the dictations, but occasionally words are mistranscribed.

## 2021-01-15 ENCOUNTER — TELEPHONE (OUTPATIENT)
Dept: INTERNAL MEDICINE | Facility: CLINIC | Age: 36
End: 2021-01-15

## 2021-01-15 DIAGNOSIS — R10.30 LOWER ABDOMINAL PAIN: Primary | ICD-10-CM

## 2021-01-15 NOTE — TELEPHONE ENCOUNTER
"I called patient.  She is \"much better\" today and up taking care of her 2 year old child. Pain did not wake her up in the night, nor does she have any discomfort this morning.  She has not had any Tylenol today.  She did get in 2 doses of Macrobid yesterday.  She believes it is OK to cancel the CT scan order and will call us if her symptoms change at all.    "

## 2021-01-15 NOTE — TELEPHONE ENCOUNTER
Reviewed results with patient during preliminary results phase-(no changes noted to final report).    Discussed presence of moderate stool burden in lower colon, which could produce her symptoms of low mid abdomen/pelvic/bladder pressure.    Advised patient to consider dose of miralax x1.  Patient seems reluctant to do so as she has IBS-D.    Patient reports symptoms about the same, pain/pressure persists.  Admits she has had 3 BMs today since in office earlier.      Encouraged patient to continue with prescribed course of macrobid, PRN doses of OTC NSAID.      Discussed that is symptoms persist, follow up on Monday/tuesday.  If symptoms worsen, seek attn at ED for further evaluation

## 2021-01-15 NOTE — TELEPHONE ENCOUNTER
Patient still with abdominal pain last night when I spoke to her about her KUB results.    Please call and inform her this morning that I placed an order for CT abd/pelvis stat today     Need to be NPO.    Also see if she ever tried dose of Miralax last pm.      If for any reason her symptoms have resolved, we can cancel the order for CT.    Thank you!!

## 2021-06-17 ENCOUNTER — OFFICE VISIT (OUTPATIENT)
Dept: INTERNAL MEDICINE | Facility: CLINIC | Age: 36
End: 2021-06-17

## 2021-06-17 VITALS
HEIGHT: 67 IN | SYSTOLIC BLOOD PRESSURE: 118 MMHG | BODY MASS INDEX: 19.46 KG/M2 | HEART RATE: 68 BPM | TEMPERATURE: 97.8 F | WEIGHT: 124 LBS | DIASTOLIC BLOOD PRESSURE: 70 MMHG

## 2021-06-17 DIAGNOSIS — K58.0 IRRITABLE BOWEL SYNDROME WITH DIARRHEA: ICD-10-CM

## 2021-06-17 DIAGNOSIS — Z13.0 SCREENING FOR DEFICIENCY ANEMIA: ICD-10-CM

## 2021-06-17 DIAGNOSIS — R63.5 WEIGHT GAIN: ICD-10-CM

## 2021-06-17 DIAGNOSIS — Z13.228 SCREENING FOR METABOLIC DISORDER: ICD-10-CM

## 2021-06-17 DIAGNOSIS — Z13.220 SCREENING CHOLESTEROL LEVEL: ICD-10-CM

## 2021-06-17 DIAGNOSIS — Z00.00 PREVENTATIVE HEALTH CARE: Primary | ICD-10-CM

## 2021-06-17 PROCEDURE — 99395 PREV VISIT EST AGE 18-39: CPT | Performed by: INTERNAL MEDICINE

## 2021-06-17 NOTE — PROGRESS NOTES
[facility]     Latosha Romero  1985   [id number]      Patient Care Team:  David Zaldivar MD as PCP - General (Internal Medicine)    Chief Complaint::   Chief Complaint   Patient presents with   • Annual Exam        HPI  Ms. Romero is now 36.  She comes in for follow-up of her IBS and for annual examination.  She is concerned about weight gain.  She has always been very slender and after she stopped nursing her second child she gained about 10 pounds.  She feels well.  She remains physically active.  Her strength and stamina are good.  She occasionally has bouts of GI distress.  She has had this for much of her life off and on as have her parents.  She has tried to identify food triggers without much success.  Often after the evening meal she feels very bloated and she can drink something carbonated and belches which then relieves it but she finds this distressing.    Chronic Conditions:      Patient Active Problem List   Diagnosis   • Irritable bowel syndrome   • Allergic rhinitis   • Annual physical exam   • Paroxysmal tachycardia, unspecified (CMS/HCC)        Past Medical History:   Diagnosis Date   • Mastitis, left, acute 9/30/2019   • Scoliosis        Past Surgical History:   Procedure Laterality Date   • NOSE SURGERY  2004   • TONSILLECTOMY  2003       Family History   Problem Relation Age of Onset   • Depression Father    • Hypertension Father    • Hyperlipidemia Father    • Diabetes Paternal Grandfather        Social History     Socioeconomic History   • Marital status:      Spouse name: Not on file   • Number of children: Not on file   • Years of education: Not on file   • Highest education level: Not on file   Tobacco Use   • Smoking status: Never Smoker   • Smokeless tobacco: Never Used   Substance and Sexual Activity   • Alcohol use: No   • Drug use: No   • Sexual activity: Yes     Partners: Male       Allergies   Allergen Reactions   • Cefzil [Cefprozil] Hives          Current Outpatient Medications:   •  BIOTIN PO, Take 1 tablet by mouth Daily., Disp: , Rfl:   •  cetirizine (zyrTEC) 10 MG tablet, Take 10 mg by mouth Daily As Needed for Allergies., Disp: , Rfl:   •  Prenatal Vit-Fe Fumarate-FA (PRENATAL VITAMIN PO), Take 1 tablet by mouth Daily., Disp: , Rfl:     Immunization History   Administered Date(s) Administered   • COVID-19 (PFIZER) 12/30/2020, 01/20/2021   • Flu Vaccine Quad PF >36MO 10/18/2017, 10/16/2018, 10/15/2019   • Hepatitis B 02/29/1996, 04/01/1996, 09/05/1996   • Influenza TIV (IM) 11/02/2015   • Influenza, Unspecified 10/19/2011, 10/17/2012, 10/17/2013, 10/12/2014, 11/07/2015, 10/11/2016, 09/27/2017, 10/15/2018, 11/01/2019, 10/11/2020   • MMR 05/14/1986, 02/29/1996   • PPD Test 09/09/2003, 09/30/2003, 04/20/2011, 05/04/2011, 01/11/2012, 01/11/2013, 01/06/2014   • Pneumococcal Polysaccharide (PPSV23) 09/28/2017   • Tdap 01/06/2009, 02/24/2016, 10/29/2018        Health Maintenance Due   Topic Date Due   • HEPATITIS C SCREENING  Never done   • PAP SMEAR  Never done   • ANNUAL PHYSICAL  06/16/2021        Review of Systems   Constitutional: Negative for activity change, chills, fatigue, fever, unexpected weight gain and unexpected weight loss.   HENT: Negative for congestion, ear pain, hearing loss, postnasal drip, sinus pressure, trouble swallowing and voice change.    Eyes: Negative for blurred vision, double vision and visual disturbance.   Respiratory: Negative for cough and shortness of breath.    Cardiovascular: Negative for chest pain, palpitations and leg swelling.   Gastrointestinal: Positive for diarrhea. Negative for abdominal pain, blood in stool, constipation, nausea, vomiting and indigestion.   Endocrine: Negative for cold intolerance, heat intolerance, polydipsia and polyuria.   Genitourinary: Negative for breast discharge, breast lump, decreased libido, dysuria, menstrual problem, urgency, urinary incontinence, vaginal bleeding and vaginal  "discharge.   Musculoskeletal: Negative for back pain, joint swelling and myalgias.   Skin: Negative for skin lesions.   Allergic/Immunologic: Negative for environmental allergies.   Neurological: Negative for dizziness, syncope, speech difficulty, weakness, light-headedness, numbness, headache, memory problem and confusion.   Hematological: Negative for adenopathy. Does not bruise/bleed easily.   Psychiatric/Behavioral: Negative for agitation, behavioral problems, decreased concentration, sleep disturbance, depressed mood and stress. The patient is not nervous/anxious.         Vital Signs  Vitals:    06/17/21 1450   BP: 118/70   BP Location: Left arm   Patient Position: Sitting   Cuff Size: Adult   Pulse: 68   Temp: 97.8 °F (36.6 °C)   TempSrc: Temporal   Weight: 56.2 kg (124 lb)   Height: 170.2 cm (67.01\")   PainSc: 0-No pain       Physical Exam  Vitals and nursing note reviewed.   Constitutional:       Appearance: She is well-developed.   HENT:      Head: Normocephalic and atraumatic.      Right Ear: External ear normal.      Left Ear: External ear normal.      Nose: Nose normal.      Mouth/Throat:      Pharynx: No oropharyngeal exudate.   Eyes:      Conjunctiva/sclera: Conjunctivae normal.      Pupils: Pupils are equal, round, and reactive to light.   Neck:      Thyroid: No thyromegaly.      Vascular: No JVD.   Cardiovascular:      Rate and Rhythm: Normal rate and regular rhythm.      Heart sounds: Normal heart sounds. No murmur heard.   No friction rub. No gallop.    Pulmonary:      Effort: Pulmonary effort is normal. No respiratory distress.      Breath sounds: Normal breath sounds. No wheezing or rales.   Abdominal:      General: Bowel sounds are normal. There is no distension.      Palpations: Abdomen is soft. There is no mass.      Tenderness: There is no abdominal tenderness. There is no guarding or rebound.      Hernia: No hernia is present.   Musculoskeletal:         General: No tenderness. Normal range " of motion.      Cervical back: Normal range of motion and neck supple.   Lymphadenopathy:      Cervical: No cervical adenopathy.   Skin:     General: Skin is warm and dry.      Findings: No erythema or rash.   Neurological:      Mental Status: She is alert and oriented to person, place, and time.      Cranial Nerves: No cranial nerve deficit.      Sensory: No sensory deficit.      Motor: No abnormal muscle tone.      Coordination: Coordination normal.      Deep Tendon Reflexes: Reflexes normal.   Psychiatric:         Behavior: Behavior normal.         Thought Content: Thought content normal.         Judgment: Judgment normal.          Procedures     Fall Risk Screen:  STEADI Fall Risk Assessment has not been completed.    Health Habits and Functional and Cognitive Screening:  No flowsheet data found.    Depression Sreening  PHQ-9 Total Score:       ACE III MINI             Assessment/Plan:    Diagnoses and all orders for this visit:    1. Preventative health care (Primary)    2. Weight gain  -     TSH; Future  -     T4, Free; Future  -     T3, Free; Future    3. Screening cholesterol level  -     Lipid Panel; Future    4. Screening for deficiency anemia  -     Comprehensive Metabolic Panel; Future    5. Screening for metabolic disorder  -     CBC & Differential; Future    6. Irritable bowel syndrome with diarrhea    Plan    Overall she remains in excellent health with good lifestyle habits.  She is up-to-date on GYN care and is fully vaccinated against COVID-19.    There is little evidence that there is a pathologic cause for weight gain.  I think it may just be the normal slowing of metabolism with aging.  We will check thyroid function.    Her bloating and IBS symptoms are puzzling.  This is most likely a food intolerance.  I suggested that she continue to look for trigger and try to avoid certain foods in a systemic manner.    Patient's Body mass index is 19.42 kg/m². indicating that she is within normal range  (BMI 18.5-24.9). No BMI management plan needed..          Labs  Results for orders placed or performed in visit on 01/14/21   POC Urinalysis Dipstick, Automated    Specimen: Urine   Result Value Ref Range    Color Yellow Yellow, Straw, Dark Yellow, Starr    Clarity, UA Clear Clear    Specific Gravity  1.030 1.005 - 1.030    pH, Urine 5.5 5.0 - 8.0    Leukocytes Negative Negative    Nitrite, UA Negative Negative    Protein, POC Negative Negative mg/dL    Glucose, UA Negative Negative, 1000 mg/dL (3+) mg/dL    Ketones, UA Negative Negative    Urobilinogen, UA Normal Normal    Bilirubin Negative Negative    Blood, UA Negative Negative        Counseling was given to patient for the following topics: appropriate exercise as she is doing, disease prevention and healthy eating habits.    Plan of care reviewed with patient at the conclusion of today's visit. Education was provided regarding diagnosis, management, and any prescribed or recommended OTC medications.Patient verbalizes understanding of and agreement with management plan.         David Zaldivar MD

## 2021-06-19 PROBLEM — J02.9 PHARYNGITIS: Status: RESOLVED | Noted: 2019-05-23 | Resolved: 2021-06-19

## 2021-06-23 ENCOUNTER — LAB (OUTPATIENT)
Dept: LAB | Facility: HOSPITAL | Age: 36
End: 2021-06-23

## 2021-06-23 DIAGNOSIS — Z13.228 SCREENING FOR METABOLIC DISORDER: ICD-10-CM

## 2021-06-23 DIAGNOSIS — R63.5 WEIGHT GAIN: ICD-10-CM

## 2021-06-23 DIAGNOSIS — Z13.220 SCREENING CHOLESTEROL LEVEL: ICD-10-CM

## 2021-06-23 DIAGNOSIS — Z13.0 SCREENING FOR DEFICIENCY ANEMIA: ICD-10-CM

## 2021-06-23 LAB
ALBUMIN SERPL-MCNC: 5.2 G/DL (ref 3.5–5.2)
ALBUMIN/GLOB SERPL: 2.7 G/DL
ALP SERPL-CCNC: 46 U/L (ref 39–117)
ALT SERPL W P-5'-P-CCNC: 10 U/L (ref 1–33)
ANION GAP SERPL CALCULATED.3IONS-SCNC: 10.6 MMOL/L (ref 5–15)
AST SERPL-CCNC: 18 U/L (ref 1–32)
BASOPHILS # BLD AUTO: 0.05 10*3/MM3 (ref 0–0.2)
BASOPHILS NFR BLD AUTO: 0.7 % (ref 0–1.5)
BILIRUB SERPL-MCNC: 0.7 MG/DL (ref 0–1.2)
BUN SERPL-MCNC: 12 MG/DL (ref 6–20)
BUN/CREAT SERPL: 15.2 (ref 7–25)
CALCIUM SPEC-SCNC: 9 MG/DL (ref 8.6–10.5)
CHLORIDE SERPL-SCNC: 104 MMOL/L (ref 98–107)
CHOLEST SERPL-MCNC: 156 MG/DL (ref 0–200)
CO2 SERPL-SCNC: 27.4 MMOL/L (ref 22–29)
CREAT SERPL-MCNC: 0.79 MG/DL (ref 0.57–1)
DEPRECATED RDW RBC AUTO: 39 FL (ref 37–54)
EOSINOPHIL # BLD AUTO: 0.14 10*3/MM3 (ref 0–0.4)
EOSINOPHIL NFR BLD AUTO: 2 % (ref 0.3–6.2)
ERYTHROCYTE [DISTWIDTH] IN BLOOD BY AUTOMATED COUNT: 11.6 % (ref 12.3–15.4)
GFR SERPL CREATININE-BSD FRML MDRD: 82 ML/MIN/1.73
GLOBULIN UR ELPH-MCNC: 1.9 GM/DL
GLUCOSE SERPL-MCNC: 91 MG/DL (ref 65–99)
HCT VFR BLD AUTO: 39.4 % (ref 34–46.6)
HDLC SERPL-MCNC: 55 MG/DL (ref 40–60)
HGB BLD-MCNC: 13.2 G/DL (ref 12–15.9)
IMM GRANULOCYTES # BLD AUTO: 0.02 10*3/MM3 (ref 0–0.05)
IMM GRANULOCYTES NFR BLD AUTO: 0.3 % (ref 0–0.5)
LDLC SERPL CALC-MCNC: 87 MG/DL (ref 0–100)
LDLC/HDLC SERPL: 1.58 {RATIO}
LYMPHOCYTES # BLD AUTO: 1.73 10*3/MM3 (ref 0.7–3.1)
LYMPHOCYTES NFR BLD AUTO: 25.3 % (ref 19.6–45.3)
MCH RBC QN AUTO: 30.3 PG (ref 26.6–33)
MCHC RBC AUTO-ENTMCNC: 33.5 G/DL (ref 31.5–35.7)
MCV RBC AUTO: 90.6 FL (ref 79–97)
MONOCYTES # BLD AUTO: 0.53 10*3/MM3 (ref 0.1–0.9)
MONOCYTES NFR BLD AUTO: 7.7 % (ref 5–12)
NEUTROPHILS NFR BLD AUTO: 4.37 10*3/MM3 (ref 1.7–7)
NEUTROPHILS NFR BLD AUTO: 64 % (ref 42.7–76)
NRBC BLD AUTO-RTO: 0 /100 WBC (ref 0–0.2)
PLATELET # BLD AUTO: 277 10*3/MM3 (ref 140–450)
PMV BLD AUTO: 10.4 FL (ref 6–12)
POTASSIUM SERPL-SCNC: 3.8 MMOL/L (ref 3.5–5.2)
PROT SERPL-MCNC: 7.1 G/DL (ref 6–8.5)
RBC # BLD AUTO: 4.35 10*6/MM3 (ref 3.77–5.28)
SODIUM SERPL-SCNC: 142 MMOL/L (ref 136–145)
T3FREE SERPL-MCNC: 3.09 PG/ML (ref 2–4.4)
T4 FREE SERPL-MCNC: 1.33 NG/DL (ref 0.93–1.7)
TRIGL SERPL-MCNC: 70 MG/DL (ref 0–150)
TSH SERPL DL<=0.05 MIU/L-ACNC: 1.93 UIU/ML (ref 0.27–4.2)
VLDLC SERPL-MCNC: 14 MG/DL (ref 5–40)
WBC # BLD AUTO: 6.84 10*3/MM3 (ref 3.4–10.8)

## 2021-06-23 PROCEDURE — 84439 ASSAY OF FREE THYROXINE: CPT

## 2021-06-23 PROCEDURE — 80061 LIPID PANEL: CPT

## 2021-06-23 PROCEDURE — 85025 COMPLETE CBC W/AUTO DIFF WBC: CPT

## 2021-06-23 PROCEDURE — 84443 ASSAY THYROID STIM HORMONE: CPT

## 2021-06-23 PROCEDURE — 80053 COMPREHEN METABOLIC PANEL: CPT

## 2021-06-23 PROCEDURE — 84481 FREE ASSAY (FT-3): CPT

## 2021-09-10 ENCOUNTER — OFFICE VISIT (OUTPATIENT)
Dept: INTERNAL MEDICINE | Facility: CLINIC | Age: 36
End: 2021-09-10

## 2021-09-10 VITALS
HEIGHT: 67 IN | HEART RATE: 80 BPM | BODY MASS INDEX: 19.87 KG/M2 | TEMPERATURE: 98.6 F | DIASTOLIC BLOOD PRESSURE: 80 MMHG | WEIGHT: 126.6 LBS | SYSTOLIC BLOOD PRESSURE: 126 MMHG

## 2021-09-10 DIAGNOSIS — H65.01 NON-RECURRENT ACUTE SEROUS OTITIS MEDIA OF RIGHT EAR: Primary | ICD-10-CM

## 2021-09-10 DIAGNOSIS — R42 VERTIGO: ICD-10-CM

## 2021-09-10 PROCEDURE — 96372 THER/PROPH/DIAG INJ SC/IM: CPT | Performed by: INTERNAL MEDICINE

## 2021-09-10 PROCEDURE — 99213 OFFICE O/P EST LOW 20 MIN: CPT | Performed by: INTERNAL MEDICINE

## 2021-09-10 RX ORDER — TRIAMCINOLONE ACETONIDE 40 MG/ML
80 INJECTION, SUSPENSION INTRA-ARTICULAR; INTRAMUSCULAR ONCE
Status: COMPLETED | OUTPATIENT
Start: 2021-09-10 | End: 2021-09-10

## 2021-09-10 RX ORDER — METHYLPREDNISOLONE 4 MG/1
TABLET ORAL
Qty: 21 TABLET | Refills: 0 | OUTPATIENT
Start: 2021-09-10 | End: 2021-09-18

## 2021-09-10 RX ORDER — MECLIZINE HYDROCHLORIDE 25 MG/1
25 TABLET ORAL 3 TIMES DAILY PRN
Qty: 30 TABLET | Refills: 0 | OUTPATIENT
Start: 2021-09-10 | End: 2021-09-18

## 2021-09-10 RX ADMIN — TRIAMCINOLONE ACETONIDE 80 MG: 40 INJECTION, SUSPENSION INTRA-ARTICULAR; INTRAMUSCULAR at 10:37

## 2021-09-10 NOTE — PROGRESS NOTES
[facility]     Latosha Romero  1985   [id number]      Patient Care Team:  David Zaldivar MD as PCP - General (Internal Medicine)    Chief Complaint::   Chief Complaint   Patient presents with   • Dizziness     onset yesterday   • Earache     right        HPI  Ms. Romero was at work yesterday, bent over to get medication out of a car and had the sudden onset of vertigo.  She had to sit down on the ground and ultimately went home.  The vertigo has persisted unrelenting lady.  Earlier this week her child was treated for an upper respiratory infection.  She has had increased congestion and fullness in the right ear.  She says she can feel her heartbeat in her right ear.  There is no fever, cough, shortness of breath or chest pain.  There is no loss of taste or smell.    Chronic Conditions:      Patient Active Problem List   Diagnosis   • Irritable bowel syndrome   • Allergic rhinitis   • Annual physical exam   • Paroxysmal tachycardia, unspecified (CMS/HCC)        Past Medical History:   Diagnosis Date   • Mastitis, left, acute 9/30/2019   • Scoliosis        Past Surgical History:   Procedure Laterality Date   • NOSE SURGERY  2004   • TONSILLECTOMY  2003       Family History   Problem Relation Age of Onset   • Depression Father    • Hypertension Father    • Hyperlipidemia Father    • Diabetes Paternal Grandfather        Social History     Socioeconomic History   • Marital status:      Spouse name: Not on file   • Number of children: Not on file   • Years of education: Not on file   • Highest education level: Not on file   Tobacco Use   • Smoking status: Never Smoker   • Smokeless tobacco: Never Used   Substance and Sexual Activity   • Alcohol use: No   • Drug use: No   • Sexual activity: Yes     Partners: Male       Allergies   Allergen Reactions   • Cefzil [Cefprozil] Hives         Current Outpatient Medications:   •  cetirizine (zyrTEC) 10 MG tablet, Take 10 mg by mouth Daily As  "Needed for Allergies., Disp: , Rfl:   •  Prenatal Vit-Fe Fumarate-FA (PRENATAL VITAMIN PO), Take 1 tablet by mouth Daily., Disp: , Rfl:     Current Facility-Administered Medications:   •  triamcinolone acetonide (KENALOG-40) injection 80 mg, 80 mg, Intramuscular, Once, David Zaldivar MD    Review of Systems   Constitutional: Negative.    HENT: Positive for ear pain.    Respiratory: Negative.  Negative for chest tightness and shortness of breath.    Cardiovascular: Negative.  Negative for chest pain.   Gastrointestinal: Negative for abdominal pain, blood in stool, constipation and diarrhea.   Neurological: Positive for dizziness.        Vital Signs  Vitals:    09/10/21 1004   BP: 126/80   BP Location: Left arm   Patient Position: Lying   Cuff Size: Adult   Pulse: 80   Temp: 98.6 °F (37 °C)   Weight: 57.4 kg (126 lb 9.6 oz)   Height: 170.2 cm (67.01\")   PainSc: 0-No pain       Physical Exam  Vitals reviewed.   Constitutional:       Appearance: She is well-developed.   HENT:      Head: Normocephalic and atraumatic.      Right Ear: A middle ear effusion is present. Tympanic membrane is bulging.      Left Ear: Tympanic membrane normal.   Cardiovascular:      Rate and Rhythm: Normal rate and regular rhythm.      Heart sounds: Normal heart sounds. No murmur heard.     Pulmonary:      Effort: Pulmonary effort is normal.      Breath sounds: Normal breath sounds.   Neurological:      Mental Status: She is alert and oriented to person, place, and time.          Procedures    ACE III MINI             Assessment/Plan:    Diagnoses and all orders for this visit:    1. Non-recurrent acute serous otitis media of right ear (Primary)  -     triamcinolone acetonide (KENALOG-40) injection 80 mg    2. Vertigo  -     triamcinolone acetonide (KENALOG-40) injection 80 mg    Plan    Vertigo secondary to serous otitis.  IM triamcinolone is administered.  She is given Medrol dose pack to take if that is not working by this evening and " meclizine to use as needed.      Plan of care reviewed with patient at the conclusion of today's visit. Education was provided regarding diagnosis, management, and any prescribed or recommended OTC medications.Patient verbalizes understanding of and agreement with management plan.         David Zaldivar MD

## 2021-09-21 ENCOUNTER — TELEPHONE (OUTPATIENT)
Dept: INTERNAL MEDICINE | Facility: CLINIC | Age: 36
End: 2021-09-21

## 2022-06-22 ENCOUNTER — OFFICE VISIT (OUTPATIENT)
Dept: INTERNAL MEDICINE | Facility: CLINIC | Age: 37
End: 2022-06-22

## 2022-06-22 VITALS
WEIGHT: 123.4 LBS | DIASTOLIC BLOOD PRESSURE: 60 MMHG | HEART RATE: 68 BPM | TEMPERATURE: 98.7 F | BODY MASS INDEX: 20.56 KG/M2 | SYSTOLIC BLOOD PRESSURE: 102 MMHG | HEIGHT: 65 IN

## 2022-06-22 DIAGNOSIS — J30.1 SEASONAL ALLERGIC RHINITIS DUE TO POLLEN: ICD-10-CM

## 2022-06-22 DIAGNOSIS — K58.0 IRRITABLE BOWEL SYNDROME WITH DIARRHEA: ICD-10-CM

## 2022-06-22 DIAGNOSIS — Z00.00 ANNUAL PHYSICAL EXAM: Primary | ICD-10-CM

## 2022-06-22 PROCEDURE — 99395 PREV VISIT EST AGE 18-39: CPT | Performed by: INTERNAL MEDICINE

## 2022-06-22 NOTE — PROGRESS NOTES
[facility]     Latosha Romero  1985   [id number]      Patient Care Team:  David Zaldivar MD as PCP - General (Internal Medicine)    Chief Complaint::   Chief Complaint   Patient presents with   • Annual Exam        HPI    The patient has consented to being recorded using THOMAS.    Weight gain  The patient states that her strength is staying good. She weighs 123 pounds today. She states that she would like to be what she was before she had children. She states that she weighed 110 to 112 pounds, but she has not made an effort to do anything about it. She states that she was going to have a baby, but it was put on hold for 4 to 6 months. She states that she had a miscarriage and had to have a D and C in 04/2021. She states she got her menstrual period back.  She states that she felt like it was 3 weeks of nonstop crying. She states that she feels okay now.    Chronic Conditions:      Patient Active Problem List   Diagnosis   • Irritable bowel syndrome   • Allergic rhinitis   • Annual physical exam   • Paroxysmal tachycardia, unspecified (HCC)        Past Medical History:   Diagnosis Date   • Mastitis, left, acute 9/30/2019   • Scoliosis        Past Surgical History:   Procedure Laterality Date   • NOSE SURGERY  2004   • TONSILLECTOMY  2003       Family History   Problem Relation Age of Onset   • Depression Father    • Hypertension Father    • Hyperlipidemia Father    • Diabetes Paternal Grandfather        Social History     Socioeconomic History   • Marital status:    Tobacco Use   • Smoking status: Never Smoker   • Smokeless tobacco: Never Used   Substance and Sexual Activity   • Alcohol use: No   • Drug use: No   • Sexual activity: Yes     Partners: Male       Allergies   Allergen Reactions   • Cefprozil Hives and Unknown - High Severity     Cefzil   • Other Unknown - High Severity     Cefzil - Unknown         Current Outpatient Medications:   •  cetirizine (zyrTEC) 10 MG tablet,  "Take 10 mg by mouth Daily As Needed for Allergies., Disp: , Rfl:   •  PRENATAL VIT-FE FUMARATE-FA PO, Take 1 tablet by mouth Daily., Disp: , Rfl:     Immunization History   Administered Date(s) Administered   • COVID-19 (PFIZER) PURPLE CAP 12/30/2020, 01/20/2021, 12/21/2021   • Flu Vaccine Quad PF >36MO 10/18/2017, 10/16/2018, 10/15/2019   • Fluzone Split Quad (Multi-dose) 11/30/2021   • Hepatitis B 02/29/1996, 04/01/1996, 09/05/1996   • Influenza TIV (IM) 11/02/2015   • Influenza, Unspecified 10/19/2011, 10/17/2012, 10/17/2013, 10/12/2014, 11/07/2015, 10/11/2016, 09/27/2017, 10/15/2018, 11/01/2019, 10/11/2020   • MMR 05/14/1986, 02/29/1996   • PPD Test 09/09/2003, 09/30/2003, 04/20/2011, 05/04/2011, 01/11/2012, 01/11/2013, 01/06/2014   • Pneumococcal Polysaccharide (PPSV23) 09/28/2017   • Tdap 01/06/2009, 02/24/2016, 10/29/2018        Health Maintenance Due   Topic Date Due   • HEPATITIS C SCREENING  Never done   • PAP SMEAR  Never done   • ANNUAL PHYSICAL  06/18/2022        Review of Systems   Constitutional: Negative for chills, fatigue and fever.   HENT: Negative for congestion, ear pain and sinus pressure.    Respiratory: Negative for cough, chest tightness, shortness of breath and wheezing.    Cardiovascular: Negative for chest pain and palpitations.   Gastrointestinal: Negative for abdominal pain, blood in stool and constipation.   Skin: Negative for color change.   Allergic/Immunologic: Negative for environmental allergies.   Neurological: Negative for dizziness, speech difficulty and headache.   Psychiatric/Behavioral: Negative for decreased concentration. The patient is not nervous/anxious.         Vital Signs  Vitals:    06/22/22 1450   BP: 102/60   BP Location: Left arm   Patient Position: Sitting   Cuff Size: Adult   Pulse: 68   Temp: 98.7 °F (37.1 °C)   Weight: 56 kg (123 lb 6.4 oz)   Height: 165.7 cm (65.25\")   PainSc: 0-No pain       Physical Exam  Vitals reviewed.   Constitutional:       Appearance: " She is well-developed.   HENT:      Head: Normocephalic and atraumatic.   Cardiovascular:      Rate and Rhythm: Normal rate and regular rhythm.      Heart sounds: Normal heart sounds. No murmur heard.  Pulmonary:      Effort: Pulmonary effort is normal.      Breath sounds: Normal breath sounds.   Neurological:      Mental Status: She is alert and oriented to person, place, and time.          Procedures     Fall Risk Screen:  JANNETTE Fall Risk Assessment has not been completed.    Health Habits and Functional and Cognitive Screening:  No flowsheet data found.    Depression [place]  PHQ-9 Total Score: 0     ACE III MINI             Assessment/Plan:    Diagnoses and all orders for this visit:    1. Annual physical exam (Primary)    2. Irritable bowel syndrome with diarrhea    3. Seasonal allergic rhinitis due to pollen          1. Prevention  - Overall, she remains in excellent health with improving lifestyle habits. She is still recovering from miscarriage in 04/2022, but she seems to be doing well emotionally. We discussed at length implications for her regarding her mother's breast cancer, osteoporosis, and her father's kidney disease. At this point, she is doing everything she needs to do. I suggested that since she cannot tolerate dairy, that she add a 500 or 600 mg calcium supplement daily with a meal with vitamin D.    2. Irritable bowel syndrome  - Controlled with diet.    3. Seasonal allergies  - Well controlled on cetirizine.    Follow up in 1 year.      Labs  Results for orders placed or performed in visit on 06/23/21   Comprehensive Metabolic Panel    Specimen: Blood   Result Value Ref Range    Glucose 91 65 - 99 mg/dL    BUN 12 6 - 20 mg/dL    Creatinine 0.79 0.57 - 1.00 mg/dL    Sodium 142 136 - 145 mmol/L    Potassium 3.8 3.5 - 5.2 mmol/L    Chloride 104 98 - 107 mmol/L    CO2 27.4 22.0 - 29.0 mmol/L    Calcium 9.0 8.6 - 10.5 mg/dL    Total Protein 7.1 6.0 - 8.5 g/dL    Albumin 5.20 3.50 - 5.20 g/dL     ALT (SGPT) 10 1 - 33 U/L    AST (SGOT) 18 1 - 32 U/L    Alkaline Phosphatase 46 39 - 117 U/L    Total Bilirubin 0.7 0.0 - 1.2 mg/dL    eGFR Non African Amer 82 >60 mL/min/1.73    Globulin 1.9 gm/dL    A/G Ratio 2.7 g/dL    BUN/Creatinine Ratio 15.2 7.0 - 25.0    Anion Gap 10.6 5.0 - 15.0 mmol/L   Lipid Panel    Specimen: Blood   Result Value Ref Range    Total Cholesterol 156 0 - 200 mg/dL    Triglycerides 70 0 - 150 mg/dL    HDL Cholesterol 55 40 - 60 mg/dL    LDL Cholesterol  87 0 - 100 mg/dL    VLDL Cholesterol 14 5 - 40 mg/dL    LDL/HDL Ratio 1.58    TSH    Specimen: Blood   Result Value Ref Range    TSH 1.930 0.270 - 4.200 uIU/mL   T4, Free    Specimen: Blood   Result Value Ref Range    Free T4 1.33 0.93 - 1.70 ng/dL   T3, Free    Specimen: Blood   Result Value Ref Range    T3, Free 3.09 2.00 - 4.40 pg/mL   CBC Auto Differential    Specimen: Blood   Result Value Ref Range    WBC 6.84 3.40 - 10.80 10*3/mm3    RBC 4.35 3.77 - 5.28 10*6/mm3    Hemoglobin 13.2 12.0 - 15.9 g/dL    Hematocrit 39.4 34.0 - 46.6 %    MCV 90.6 79.0 - 97.0 fL    MCH 30.3 26.6 - 33.0 pg    MCHC 33.5 31.5 - 35.7 g/dL    RDW 11.6 (L) 12.3 - 15.4 %    RDW-SD 39.0 37.0 - 54.0 fl    MPV 10.4 6.0 - 12.0 fL    Platelets 277 140 - 450 10*3/mm3    Neutrophil % 64.0 42.7 - 76.0 %    Lymphocyte % 25.3 19.6 - 45.3 %    Monocyte % 7.7 5.0 - 12.0 %    Eosinophil % 2.0 0.3 - 6.2 %    Basophil % 0.7 0.0 - 1.5 %    Immature Grans % 0.3 0.0 - 0.5 %    Neutrophils, Absolute 4.37 1.70 - 7.00 10*3/mm3    Lymphocytes, Absolute 1.73 0.70 - 3.10 10*3/mm3    Monocytes, Absolute 0.53 0.10 - 0.90 10*3/mm3    Eosinophils, Absolute 0.14 0.00 - 0.40 10*3/mm3    Basophils, Absolute 0.05 0.00 - 0.20 10*3/mm3    Immature Grans, Absolute 0.02 0.00 - 0.05 10*3/mm3    nRBC 0.0 0.0 - 0.2 /100 WBC      BMI is within normal parameters. No other follow-up for BMI required.      Counseling was given to patient for the following topics: appropriate exercise 150 minutes/week,  disease prevention and healthy eating habits.    Plan of care reviewed with patient at the conclusion of today's visit. Education was provided regarding diagnosis, management, and any prescribed or recommended OTC medications.Patient verbalizes understanding of and agreement with management plan.         David Zaldivar MD       Transcribed from ambient dictation for David Zaldivar MD by KATHRINE CLEVELAND.  06/22/22   15:51 EDT    Patient verbalized consent to the visit recording.

## 2022-10-21 ENCOUNTER — FLU SHOT (OUTPATIENT)
Dept: INTERNAL MEDICINE | Facility: CLINIC | Age: 37
End: 2022-10-21

## 2022-10-21 DIAGNOSIS — Z23 NEED FOR INFLUENZA VACCINATION: Primary | ICD-10-CM

## 2022-10-21 PROCEDURE — 90471 IMMUNIZATION ADMIN: CPT | Performed by: INTERNAL MEDICINE

## 2022-10-21 PROCEDURE — 90686 IIV4 VACC NO PRSV 0.5 ML IM: CPT | Performed by: INTERNAL MEDICINE

## 2022-11-28 RX ORDER — OSELTAMIVIR PHOSPHATE 30 MG/1
30 CAPSULE ORAL EVERY 12 HOURS SCHEDULED
Qty: 10 CAPSULE | Refills: 0 | Status: SHIPPED | OUTPATIENT
Start: 2022-11-28 | End: 2022-11-29 | Stop reason: DRUGHIGH

## 2022-11-29 RX ORDER — OSELTAMIVIR PHOSPHATE 75 MG/1
75 CAPSULE ORAL DAILY
Qty: 10 CAPSULE | Refills: 0 | Status: SHIPPED | OUTPATIENT
Start: 2022-11-29

## 2022-11-29 NOTE — TELEPHONE ENCOUNTER
Called and spoke with Toni. He stated the normal dose for adults for prophylactic is 75mg once daily x 10 days. Please update rx and resend.

## 2022-11-29 NOTE — TELEPHONE ENCOUNTER
Pharmacy Name: [facility] [id number] - [place], [place] - [address] AT [facility] [address] - [phone]  - [phone]     Pharmacy representative name: SHIRA    Pharmacy representative phone number:[phone]    What medication are you calling in regards to:  oseltamivir (TAMIFLU) 30 MG capsule     What question does the pharmacy have: VERIFY DOSAGE    Who is the provider that prescribed the medication: CHLOE DOMÍNGUEZ

## 2023-12-13 ENCOUNTER — OFFICE VISIT (OUTPATIENT)
Dept: INTERNAL MEDICINE | Facility: CLINIC | Age: 38
End: 2023-12-13
Payer: COMMERCIAL

## 2023-12-13 VITALS
HEART RATE: 70 BPM | DIASTOLIC BLOOD PRESSURE: 70 MMHG | TEMPERATURE: 98.9 F | BODY MASS INDEX: 22.87 KG/M2 | WEIGHT: 146 LBS | SYSTOLIC BLOOD PRESSURE: 116 MMHG

## 2023-12-13 DIAGNOSIS — H10.32 ACUTE BACTERIAL CONJUNCTIVITIS OF LEFT EYE: Primary | ICD-10-CM

## 2023-12-13 PROCEDURE — 99213 OFFICE O/P EST LOW 20 MIN: CPT | Performed by: INTERNAL MEDICINE

## 2023-12-13 RX ORDER — NEOMYCIN/POLYMYXIN B/HYDROCORT 3.5-10K-1
1 SUSPENSION, DROPS(FINAL DOSAGE FORM)(ML) OPHTHALMIC (EYE)
Qty: 7.5 ML | Refills: 0 | Status: SHIPPED | OUTPATIENT
Start: 2023-12-13

## 2023-12-13 NOTE — PROGRESS NOTES
[facility]     Latosha Romero  1985   [id number]      Patient Care Team:  David Zaldivar MD as PCP - General (Internal Medicine)    Chief Complaint::   Chief Complaint   Patient presents with    Conjunctivitis     Left side ,since Saturday         HPI  The patient comes in complaining of conjunctivitis.    COVID-19/ conjunctivitis  She was 31 weeks pregnant in 2023 and went to the hospital because her Apple watch reported she had a high resting heart rate of 168 bpm. She was told to start drinking water. When she arrived, her heart rate was in the 150s bpm, and she was given 2 to 3 bags of fluid. She was kept for a few hours and was sent home. She began experiencing conjunctivitis on 2023. It has improved on Daron 12/10/2023 and was painful on 2023 and 2023.      section  She had to have a  with her baby who is 3 months now. Her incision is tender. She began doing basic workouts for exercise. She has been doing low Pilates for less than 20 minutes. She quits when she begins to experience pain or burning sensation. She was told that it is just where the sutures have not completely dissolved.      Chronic Conditions:      Patient Active Problem List   Diagnosis    Irritable bowel syndrome    Allergic rhinitis    Annual physical exam    Paroxysmal tachycardia, unspecified        Past Medical History:   Diagnosis Date    Mastitis, left, acute 2019    Scoliosis        Past Surgical History:   Procedure Laterality Date    NOSE SURGERY  2004    TONSILLECTOMY         Family History   Problem Relation Age of Onset    Depression Father     Hypertension Father     Hyperlipidemia Father     Diabetes Paternal Grandfather        Social History     Socioeconomic History    Marital status:    Tobacco Use    Smoking status: Never     Passive exposure: Never    Smokeless tobacco: Never   Vaping Use    Vaping Use: Never used   Substance and  Sexual Activity    Alcohol use: No    Drug use: No    Sexual activity: Yes     Partners: Male       Allergies   Allergen Reactions    Cefprozil Hives and Unknown - High Severity     Cefzil    Other Unknown - High Severity     Cefzil - Unknown         Current Outpatient Medications:     PRENATAL VIT-FE FUMARATE-FA PO, Take 1 tablet by mouth Daily., Disp: , Rfl:     acetaminophen (TYLENOL) 325 MG tablet, , Disp: , Rfl:     fluticasone (FLONASE) 50 MCG/ACT nasal spray, 2 sprays into the nostril(s) as directed by provider Daily. (Patient not taking: Reported on 12/13/2023), Disp: 16 g, Rfl: 0    ibuprofen (ADVIL,MOTRIN) 600 MG tablet, , Disp: , Rfl:     neomycin-polymyxin-hydrocortisone (CORTISPORIN) 3.5-32427-7 ophthalmic suspension, Administer 1 drop into the left eye Every 4 (Four) Hours While Awake., Disp: 7.5 mL, Rfl: 0    Review of Systems   Constitutional:  Negative for chills, fatigue and fever.   HENT:  Negative for congestion, ear pain and sinus pressure.    Respiratory:  Negative for cough, chest tightness, shortness of breath and wheezing.    Cardiovascular:  Negative for chest pain and palpitations.   Gastrointestinal:  Negative for abdominal pain, blood in stool and constipation.   Skin:  Negative for color change.   Allergic/Immunologic: Negative for environmental allergies.   Neurological:  Negative for dizziness, speech difficulty and headache.   Psychiatric/Behavioral:  Negative for decreased concentration. The patient is not nervous/anxious.         Vital Signs  Vitals:    12/13/23 1557   BP: 116/70   BP Location: Right arm   Patient Position: Sitting   Cuff Size: Adult   Pulse: 70   Temp: 98.9 °F (37.2 °C)   TempSrc: Temporal   Weight: 66.2 kg (146 lb)   PainSc: 0-No pain       Physical Exam  Vitals reviewed.   Constitutional:       Appearance: She is well-developed.   HENT:      Head: Normocephalic and atraumatic.      Right Ear: Tympanic membrane and ear canal normal.      Left Ear: Tympanic  membrane and ear canal normal.      Mouth/Throat:      Pharynx: Oropharynx is clear. No posterior oropharyngeal erythema.   Eyes:      Comments: There is redness in the conjunctiva of the left eye. There is no purulent drainage.   Cardiovascular:      Rate and Rhythm: Normal rate and regular rhythm.      Heart sounds: Normal heart sounds. No murmur heard.  Pulmonary:      Effort: Pulmonary effort is normal.      Breath sounds: Normal breath sounds.   Neurological:      Mental Status: She is alert and oriented to person, place, and time.          Procedures    ACE III MINI             Assessment/Plan:    Diagnoses and all orders for this visit:    1. Acute bacterial conjunctivitis of left eye (Primary)    Other orders  -     neomycin-polymyxin-hydrocortisone (CORTISPORIN) 3.5-86176-3 ophthalmic suspension; Administer 1 drop into the left eye Every 4 (Four) Hours While Awake.  Dispense: 7.5 mL; Refill: 0        1. Conjunctivitis, left eye  - She was given antibiotic steroid drops and will follow up as needed.    Plan of care reviewed with patient at the conclusion of today's visit. Education was provided regarding diagnosis, management, and any prescribed or recommended OTC medications.Patient verbalizes understanding of and agreement with management plan.       David Zaldivar MD         Transcribed from ambient dictation for David Zaldivar MD by Leanne Florence.  12/14/23   12:08 EST    Patient or patient representative verbalized consent to the visit recording.  I have personally performed the services described in this document as transcribed by the above individual, and it is both accurate and complete.

## 2024-03-06 ENCOUNTER — OFFICE VISIT (OUTPATIENT)
Dept: INTERNAL MEDICINE | Facility: CLINIC | Age: 39
End: 2024-03-06
Payer: COMMERCIAL

## 2024-03-06 VITALS
OXYGEN SATURATION: 98 % | HEIGHT: 67 IN | DIASTOLIC BLOOD PRESSURE: 74 MMHG | BODY MASS INDEX: 21.75 KG/M2 | HEART RATE: 74 BPM | WEIGHT: 138.6 LBS | TEMPERATURE: 98.4 F | SYSTOLIC BLOOD PRESSURE: 124 MMHG

## 2024-03-06 DIAGNOSIS — Z00.00 ANNUAL PHYSICAL EXAM: Primary | ICD-10-CM

## 2024-03-06 PROCEDURE — 99395 PREV VISIT EST AGE 18-39: CPT | Performed by: INTERNAL MEDICINE

## 2024-03-06 NOTE — PROGRESS NOTES
[facility]     Latosha Romero  1985   [id number]      Patient Care Team:  David Zaldivar MD as PCP - General (Internal Medicine)    Chief Complaint::   Chief Complaint   Patient presents with    Annual Exam        HPI  The patient is a 39-year-old female who comes in for annual examination.    She had an echocardiogram last year. She was told to come back when she was 1 year postpartum. She was told that it was likely just pregnancy flow. She was told to follow up every 5 years unless she got symptomatic. She was having a lot of PVCs. Her OB told her that it was common during pregnancy. By the time she got to 24 weeks, they went away and she has not had them back. Her strength, stamina, and energy level are good.    She saw Dr. Mckeon, plastic surgeon, who told her that he does not think she has a hernia. He thinks it is scar tissue. He told her that it is adhered to the fascia and the only way to fix it is to get a tummy tuck. He told her to finish breastfeeding and then regroup in 6 months to 1 year. He told her to rub it constantly. She does not want to have a tummy tuck. She has always maintained her weight.    Chronic Conditions:      Patient Active Problem List   Diagnosis    Irritable bowel syndrome    Allergic rhinitis    Annual physical exam    Paroxysmal tachycardia, unspecified        Past Medical History:   Diagnosis Date    Mastitis, left, acute 9/30/2019    Scoliosis        Past Surgical History:   Procedure Laterality Date    NOSE SURGERY  2004    TONSILLECTOMY  2003       Family History   Problem Relation Age of Onset    Depression Father     Hypertension Father     Hyperlipidemia Father     Diabetes Paternal Grandfather        Social History     Socioeconomic History    Marital status:    Tobacco Use    Smoking status: Never     Passive exposure: Never    Smokeless tobacco: Never   Vaping Use    Vaping status: Never Used   Substance and Sexual Activity    Alcohol  "use: No    Drug use: No    Sexual activity: Yes     Partners: Male       Allergies   Allergen Reactions    Cefprozil Hives and Unknown - High Severity     Cefzil    Other Unknown - High Severity     Cefzil - Unknown         Current Outpatient Medications:     PRENATAL VIT-FE FUMARATE-FA PO, Take 1 tablet by mouth Daily., Disp: , Rfl:     Review of Systems   Constitutional:  Negative for chills, fatigue and fever.   HENT:  Negative for congestion, ear pain and sinus pressure.    Respiratory:  Negative for cough, chest tightness, shortness of breath and wheezing.    Cardiovascular:  Negative for chest pain and palpitations.   Gastrointestinal:  Negative for abdominal pain, blood in stool and constipation.   Skin:  Negative for color change.   Allergic/Immunologic: Negative for environmental allergies.   Neurological:  Negative for dizziness, speech difficulty and headache.   Psychiatric/Behavioral:  Negative for decreased concentration. The patient is not nervous/anxious.         Vital Signs  Vitals:    03/06/24 0847   BP: 124/74   BP Location: Right arm   Patient Position: Sitting   Cuff Size: Adult   Pulse: 74   Temp: 98.4 °F (36.9 °C)   TempSrc: Infrared   SpO2: 98%   Weight: 62.9 kg (138 lb 9.6 oz)   Height: 170.2 cm (67\")   PainSc: 0-No pain       Physical Exam  Constitutional:       Appearance: Normal appearance.   HENT:      Right Ear: Tympanic membrane and ear canal normal.      Left Ear: Tympanic membrane and ear canal normal.      Mouth/Throat:      Pharynx: Oropharynx is clear. No posterior oropharyngeal erythema.   Eyes:      Extraocular Movements: Extraocular movements intact.      Pupils: Pupils are equal, round, and reactive to light.   Neck:      Vascular: No carotid bruit.   Cardiovascular:      Rate and Rhythm: Normal rate and regular rhythm.   Pulmonary:      Effort: Pulmonary effort is normal.      Breath sounds: Normal breath sounds.   Abdominal:      General: Bowel sounds are normal.      " Palpations: Abdomen is soft.      Tenderness: There is no abdominal tenderness.   Musculoskeletal:      Cervical back: Normal range of motion and neck supple.      Right lower leg: No edema.      Left lower leg: No edema.   Neurological:      Mental Status: She is alert and oriented to person, place, and time.      Cranial Nerves: No cranial nerve deficit.      Motor: No weakness.      Gait: Gait normal.   Psychiatric:         Behavior: Behavior normal.          Procedures    ACE III MINI             Assessment/Plan:    Diagnoses and all orders for this visit:    1. Annual physical exam (Primary)      1. Prevention.  Overall, she remains in very good health with reasonably good lifestyle habits, given the fact that she is nursing. we discussed the importance of some weightbearing exercise for maintenance of bone strength. She was advised by her obstetrician not to have cholesterol checked while she is nursing. We will plan labs next year.    Follow-up  The patient will follow up in 1 year.    Plan of care reviewed with patient at the conclusion of today's visit. Education was provided regarding diagnosis, management, and any prescribed or recommended OTC medications.Patient verbalizes understanding of and agreement with management plan.       Transcribed from ambient dictation for David Zaldivar MD by Consuelo JARAMILLO  03/06/24   11:44 EST    Patient or patient representative verbalized consent to the visit recording.  I have personally performed the services described in this document as transcribed by the above individual, and it is both accurate and complete.

## 2024-07-12 ENCOUNTER — OFFICE VISIT (OUTPATIENT)
Dept: INTERNAL MEDICINE | Facility: CLINIC | Age: 39
End: 2024-07-12
Payer: COMMERCIAL

## 2024-07-12 VITALS
DIASTOLIC BLOOD PRESSURE: 68 MMHG | TEMPERATURE: 97.1 F | BODY MASS INDEX: 20.4 KG/M2 | SYSTOLIC BLOOD PRESSURE: 122 MMHG | HEIGHT: 67 IN | HEART RATE: 84 BPM | WEIGHT: 130 LBS

## 2024-07-12 DIAGNOSIS — J01.10 ACUTE NON-RECURRENT FRONTAL SINUSITIS: ICD-10-CM

## 2024-07-12 DIAGNOSIS — R05.1 ACUTE COUGH: Primary | ICD-10-CM

## 2024-07-12 LAB
EXPIRATION DATE: NORMAL
FLUAV AG UPPER RESP QL IA.RAPID: NOT DETECTED
FLUBV AG UPPER RESP QL IA.RAPID: NOT DETECTED
INTERNAL CONTROL: NORMAL
Lab: NORMAL
SARS-COV-2 AG UPPER RESP QL IA.RAPID: NOT DETECTED

## 2024-07-12 PROCEDURE — 87428 SARSCOV & INF VIR A&B AG IA: CPT

## 2024-07-12 PROCEDURE — 99213 OFFICE O/P EST LOW 20 MIN: CPT

## 2024-07-12 RX ORDER — AMOXICILLIN AND CLAVULANATE POTASSIUM 875; 125 MG/1; MG/1
1 TABLET, FILM COATED ORAL 2 TIMES DAILY
Qty: 14 TABLET | Refills: 0 | Status: SHIPPED | OUTPATIENT
Start: 2024-07-12 | End: 2024-07-19

## 2024-07-12 NOTE — PROGRESS NOTES
"    Acute Office Visit      Name: Latosha Romero    : 1985     MRN: [id number]   Care Team: Patient Care Team:  David Zaldivar MD as PCP - General (Internal Medicine)    Chief Complaint  Earache (Left ear ache), Fever (), Cough, and Generalized Body Aches    Subjective     History of Present Illness:    Latosha is a pleasant 39-year-old female who comes to the office with complaints of cough, body aches, fever, left ear ache.    She states that her symptoms began on Tuesday with nasal drainage.  She noted a temp of  over the last couple of days.  She reports chest heaviness, denies pain and shortness of breath.  She has been experiencing a cough with phlegm.  She reports discomfort to her left ear, nothing on the right.  She is also experiencing pressure behind her eyes.    She denies sick contacts other than her father who recently had pneumonia.    Review of Systems:  Cough, left ear pain  Past Medical History:   Diagnosis Date    Mastitis, left, acute 2019    Scoliosis        Past Surgical History:   Procedure Laterality Date    NOSE SURGERY  2004    TONSILLECTOMY  2003       Social History     Socioeconomic History    Marital status:    Tobacco Use    Smoking status: Never     Passive exposure: Never    Smokeless tobacco: Never   Vaping Use    Vaping status: Never Used   Substance and Sexual Activity    Alcohol use: No    Drug use: No    Sexual activity: Yes     Partners: Male         Current Outpatient Medications:     PRENATAL VIT-FE FUMARATE-FA PO, Take 1 tablet by mouth Daily., Disp: , Rfl:     amoxicillin-clavulanate (AUGMENTIN) 875-125 MG per tablet, Take 1 tablet by mouth 2 (Two) Times a Day for 7 days., Disp: 14 tablet, Rfl: 0    Procedures    PHQ-9 Total Score:      Objective     Vital Signs  /68 (BP Location: Left arm, Patient Position: Sitting, Cuff Size: Adult)   Pulse 84   Temp 97.1 °F (36.2 °C) (Temporal)   Ht 170.2 cm (67.01\")   Wt 59 kg (130 lb)   " "BMI 20.36 kg/m²   Estimated body mass index is 20.36 kg/m² as calculated from the following:    Height as of this encounter: 170.2 cm (67.01\").    Weight as of this encounter: 59 kg (130 lb).    BMI is within normal parameters. No other follow-up for BMI required.      Physical Exam  Vitals and nursing note reviewed.   HENT:      Head: Normocephalic.      Right Ear: Ear canal and external ear normal. A middle ear effusion is present.      Left Ear: Ear canal and external ear normal. A middle ear effusion is present.      Nose: Congestion present.      Mouth/Throat:      Mouth: Mucous membranes are moist.      Pharynx: Posterior oropharyngeal erythema present.   Eyes:      Pupils: Pupils are equal, round, and reactive to light.   Cardiovascular:      Rate and Rhythm: Normal rate.   Pulmonary:      Effort: Pulmonary effort is normal.      Breath sounds: Normal breath sounds.   Skin:     General: Skin is warm and dry.   Neurological:      General: No focal deficit present.      Mental Status: She is alert and oriented to person, place, and time.   Psychiatric:         Mood and Affect: Mood normal.         Behavior: Behavior normal.         Thought Content: Thought content normal.         Judgment: Judgment normal.          Assessment and Plan     Assessment/Plan:  Diagnoses and all orders for this visit:    1. Acute cough (Primary)  -     POCT SARS-CoV-2 + Flu Antigen JOSE  -COVID/flu negative in office today.    2. Acute non-recurrent frontal sinusitis  -     amoxicillin-clavulanate (AUGMENTIN) 875-125 MG per tablet; Take 1 tablet by mouth 2 (Two) Times a Day for 7 days.  Dispense: 14 tablet; Refill: 0  -Continue with oral antihistamine and Flonase nasal spray-2 sprays each nostril daily.  -If symptoms continue to worsen, start Augmentin twice daily x 7 days.  -Increase consumption of oral fluids.     There are no Patient Instructions on file for this visit.  Plan of care reviewed with patient at the conclusion of " today's visit. Education was provided regarding diagnosis, management and any prescribed or recommended OTC medications.  Patient verbalizes understanding of and agreement with management plan.    Follow Up  Return for Next Scheduled Follow up.    EMILI Mcfarlane

## 2024-10-02 ENCOUNTER — OFFICE VISIT (OUTPATIENT)
Dept: INTERNAL MEDICINE | Facility: CLINIC | Age: 39
End: 2024-10-02
Payer: COMMERCIAL

## 2024-10-02 VITALS
SYSTOLIC BLOOD PRESSURE: 124 MMHG | WEIGHT: 127.6 LBS | BODY MASS INDEX: 20.03 KG/M2 | TEMPERATURE: 98 F | OXYGEN SATURATION: 97 % | HEIGHT: 67 IN | DIASTOLIC BLOOD PRESSURE: 66 MMHG | HEART RATE: 82 BPM

## 2024-10-02 DIAGNOSIS — J01.20 ACUTE NON-RECURRENT ETHMOIDAL SINUSITIS: Primary | ICD-10-CM

## 2024-10-02 PROCEDURE — 99213 OFFICE O/P EST LOW 20 MIN: CPT | Performed by: INTERNAL MEDICINE

## 2024-10-02 NOTE — PROGRESS NOTES
[facility]     Latosha FLOWERS Romero  1985   [id number]      Patient Care Team:  David Zaldivar MD as PCP - General (Internal Medicine)    Chief Complaint::   Chief Complaint   Patient presents with    Headache     Since saturday        HPI  History of Present Illness  The patient is a 39-year-old female who presents with a headache that has persisted for several days.    She reports waking up on Saturday with a severe headache, which intensified as the day progressed. Despite taking three Advil and two Tylenol, and going to bed early at 9:30 PM, she woke up on Sunday still feeling unwell. She describes the headache as encompassing her entire head, neck, and shoulders. The pain was so severe that she spent most of Sunday in bed. On Monday, she woke up with the same headache, which has persisted since then.    She mentions experiencing pain in her eyes when she moves them without turning her head. She took Motrin yesterday, which provided some relief, allowing her to manage a 3-hour field trip. However, the pain returns upon waking up after sleep.    She started her menstrual cycle on Sunday and wonders if this could be contributing to her symptoms. She has never experienced migraines before and describes the pain as initially being at the back of her head, but later moving to the front. She also reports some morning drainage, which she coughs up.    She reports no fever, dizziness, or other symptoms apart from the headache. She is currently breastfeeding.      Chronic Conditions:      Patient Active Problem List   Diagnosis    Irritable bowel syndrome    Allergic rhinitis    Annual physical exam    Paroxysmal tachycardia, unspecified        Past Medical History:   Diagnosis Date    Mastitis, left, acute 9/30/2019    Scoliosis        Past Surgical History:   Procedure Laterality Date    NOSE SURGERY  2004    TONSILLECTOMY  2003       Family History   Problem Relation Age of Onset    Depression  "Father     Hypertension Father     Hyperlipidemia Father     Diabetes Paternal Grandfather        Social History     Socioeconomic History    Marital status:    Tobacco Use    Smoking status: Never     Passive exposure: Never    Smokeless tobacco: Never   Vaping Use    Vaping status: Never Used   Substance and Sexual Activity    Alcohol use: No    Drug use: No    Sexual activity: Yes     Partners: Male       Allergies   Allergen Reactions    Cefprozil Hives and Unknown - High Severity     Cefzil    Other Unknown - High Severity     Cefzil - Unknown         Current Outpatient Medications:     PRENATAL VIT-FE FUMARATE-FA PO, Take 1 tablet by mouth Daily., Disp: , Rfl:     amoxicillin-clavulanate (AUGMENTIN) 875-125 MG per tablet, Take 1 tablet by mouth 2 (Two) Times a Day., Disp: 14 tablet, Rfl: 0    Review of Systems   Constitutional: Negative.    Respiratory: Negative.  Negative for chest tightness and shortness of breath.    Cardiovascular: Negative.  Negative for chest pain.   Gastrointestinal:  Negative for abdominal pain, blood in stool, constipation and diarrhea.        Vital Signs  Vitals:    10/02/24 1450   BP: 124/66   BP Location: Right arm   Patient Position: Sitting   Cuff Size: Adult   Pulse: 82   Temp: 98 °F (36.7 °C)   TempSrc: Infrared   SpO2: 97%   Weight: 57.9 kg (127 lb 9.6 oz)   Height: 170.2 cm (67.01\")   PainSc:   4   PainLoc: Head       Physical Exam  Vitals reviewed.   Constitutional:       Appearance: Normal appearance. She is well-developed.   HENT:      Head: Normocephalic and atraumatic.      Right Ear: Tympanic membrane and ear canal normal.      Left Ear: Tympanic membrane and ear canal normal.      Nose:      Right Sinus: No maxillary sinus tenderness or frontal sinus tenderness.      Left Sinus: No maxillary sinus tenderness or frontal sinus tenderness.      Mouth/Throat:      Pharynx: Oropharynx is clear. No posterior oropharyngeal erythema.   Eyes:      Extraocular Movements: " Extraocular movements intact.      Conjunctiva/sclera: Conjunctivae normal.      Pupils: Pupils are equal, round, and reactive to light.   Cardiovascular:      Rate and Rhythm: Normal rate and regular rhythm.      Heart sounds: Normal heart sounds. No murmur heard.  Pulmonary:      Effort: Pulmonary effort is normal.      Breath sounds: Normal breath sounds.   Neurological:      Mental Status: She is alert and oriented to person, place, and time.        Physical Exam      Procedures    ACE III MINI        Results             Assessment/Plan:    Diagnoses and all orders for this visit:    1. Acute non-recurrent ethmoidal sinusitis (Primary)    Other orders  -     amoxicillin-clavulanate (AUGMENTIN) 875-125 MG per tablet; Take 1 tablet by mouth 2 (Two) Times a Day.  Dispense: 14 tablet; Refill: 0      Assessment & Plan  1. Acute sinusitis.  She has had a constant bilateral post-orbital headache for 4 to 5 days. There are no other symptoms and no neurologic deficits. Tenderness behind orbits suggests sinusitis, despite the absence of other typical symptoms. She will be treated empirically with Augmentin. Due to breastfeeding, she cannot take steroids or decongestants, which would be the adjunctive treatment. If there is no significant improvement after completing the course of Augmentin, she will return for further evaluation.        Plan of care reviewed with patient at the conclusion of today's visit. Education was provided regarding diagnosis, management, and any prescribed or recommended OTC medications.Patient verbalizes understanding of and agreement with management plan.     Patient or patient representative verbalized consent for the use of Ambient Listening during the visit with  David Zaldivar MD for chart documentation. 10/2/2024  20:08 EDT        David Zaldivar MD

## 2024-10-10 ENCOUNTER — TELEPHONE (OUTPATIENT)
Dept: INTERNAL MEDICINE | Facility: CLINIC | Age: 39
End: 2024-10-10

## 2024-10-10 NOTE — TELEPHONE ENCOUNTER
Per Dr. Zaldivar at pt's last appointment, she is to see Dr. Pressley if migraines do not resolve.  Please schedule and call pt.

## 2024-10-10 NOTE — TELEPHONE ENCOUNTER
Caller: Latosha Romero    Relationship: Self    Best call back number: [phone]     What was the call regarding: PATIENT STATES THAT SHE IS STILL HAVING HEADACHES. SHE WOULD LIKE TO SEE DR SALGADO AGAIN BUT HIS FIRST AVAILABLE ISN'T UNTIL 10/24/24.    Is it okay if the provider responds through MyChart: NO

## 2024-10-14 ENCOUNTER — OFFICE VISIT (OUTPATIENT)
Dept: INTERNAL MEDICINE | Facility: CLINIC | Age: 39
End: 2024-10-14
Payer: COMMERCIAL

## 2024-10-14 VITALS
TEMPERATURE: 97.8 F | BODY MASS INDEX: 20.34 KG/M2 | DIASTOLIC BLOOD PRESSURE: 60 MMHG | HEIGHT: 67 IN | SYSTOLIC BLOOD PRESSURE: 114 MMHG | HEART RATE: 86 BPM | WEIGHT: 129.6 LBS | OXYGEN SATURATION: 100 %

## 2024-10-14 DIAGNOSIS — G44.001 INTRACTABLE CLUSTER HEADACHE SYNDROME, UNSPECIFIED CHRONICITY PATTERN: Primary | ICD-10-CM

## 2024-10-14 PROCEDURE — 99214 OFFICE O/P EST MOD 30 MIN: CPT | Performed by: STUDENT IN AN ORGANIZED HEALTH CARE EDUCATION/TRAINING PROGRAM

## 2024-10-14 RX ORDER — SUMATRIPTAN 50 MG/1
TABLET, FILM COATED ORAL
Qty: 30 TABLET | Refills: 1 | Status: SHIPPED | OUTPATIENT
Start: 2024-10-14

## 2024-10-14 NOTE — PROGRESS NOTES
Office Note     Name: Latosha Romero    : 1985     MRN: [id number]     Chief Complaint  No chief complaint on file.    Subjective     History of Present Illness:  Latosha Romero is a 39 y.o. female who presents today for reevaluation of severe headache.  She is initially evaluated on 10/04 for sudden onset intense severe headache nonresponsive to Advil and Tylenol.  She also has painful eye movements.  At that time she was treated for presumed sinusitis with Augmentin.  She is currently breast-feeding so steroids and decongestions were deferred.  She reports her headache began on , she woke up with a severe headache across her entire head.  By  the headache was more located frontal.  She has had an optometry evaluation and was told that there were no abnormalities on eye exam.  The headache has improved since being seen on 10/04 but has not completely resolved.  She has been taking Zyrtec as needed for the past few days for allergies which has not helped her headache.  She is not having any visual changes or neurologic deficits.        Review of Systems:   Review of Systems   Constitutional:  Negative for chills and fever.   Respiratory:  Negative for cough and shortness of breath.    Cardiovascular:  Negative for chest pain and palpitations.   Neurological:  Positive for headache. Negative for dizziness.       Past Medical History:   Past Medical History:   Diagnosis Date    Mastitis, left, acute 2019    Scoliosis        Past Surgical History:   Past Surgical History:   Procedure Laterality Date    NOSE SURGERY  2004    TONSILLECTOMY         Family History:   Family History   Problem Relation Age of Onset    Depression Father     Hypertension Father     Hyperlipidemia Father     Diabetes Paternal Grandfather        Social History:   Social History     Socioeconomic History    Marital status:    Tobacco Use    Smoking status: Never     Passive exposure: Never    Smokeless tobacco:  Never   Vaping Use    Vaping status: Never Used   Substance and Sexual Activity    Alcohol use: No    Drug use: No    Sexual activity: Yes     Partners: Male       Immunizations:   Immunization History   Administered Date(s) Administered    COVID-19 (PFIZER) Purple Cap Monovalent 12/30/2020, 01/20/2021, 12/21/2021    Flu Vaccine Quad PF >36MO 10/18/2017, 10/16/2018, 10/15/2019    Fluzone (or Fluarix & Flulaval for VFC) >6mos 10/21/2022    Fluzone Quad >6mos (Multi-dose) 11/30/2021    Hepatitis B Adult/Adolescent IM 02/29/1996, 04/01/1996, 09/05/1996    Influenza TIV (IM) 11/02/2015    Influenza, Unspecified 10/19/2011, 10/17/2012, 10/17/2013, 10/12/2014, 11/07/2015, 10/11/2016, 09/27/2017, 10/15/2018, 11/01/2019, 10/11/2020    MMR 05/14/1986, 02/29/1996    PPD Test 09/09/2003, 09/30/2003, 04/20/2011, 05/04/2011, 01/11/2012, 01/11/2013, 01/06/2014    Pneumococcal Polysaccharide (PPSV23) 09/28/2017    Tdap 01/06/2009, 02/24/2016, 10/29/2018, 08/21/2023        Medications:     Current Outpatient Medications:     amoxicillin-clavulanate (AUGMENTIN) 875-125 MG per tablet, Take 1 tablet by mouth 2 (Two) Times a Day., Disp: 14 tablet, Rfl: 0    PRENATAL VIT-FE FUMARATE-FA PO, Take 1 tablet by mouth Daily., Disp: , Rfl:     Allergies:   Allergies   Allergen Reactions    Cefprozil Hives and Unknown - High Severity     Cefzil    Other Unknown - High Severity     Cefzil - Unknown       Objective     Vital Signs  There were no vitals taken for this visit.  There is no height or weight on file to calculate BMI.     BMI is within normal parameters. No other follow-up for BMI required.       Physical Exam  Constitutional:       Appearance: Normal appearance.   HENT:      Head: Normocephalic and atraumatic.   Eyes:      Extraocular Movements: Extraocular movements intact.      Conjunctiva/sclera: Conjunctivae normal.   Skin:     General: Skin is warm and dry.   Neurological:      General: No focal deficit present.      Mental  Status: She is alert and oriented to person, place, and time.   Psychiatric:         Mood and Affect: Mood normal.         Behavior: Behavior normal.          Assessment and Plan     Assessment/Plan:  Diagnoses and all orders for this visit:    1. Headache (Primary)  - Differential diagnosis includes cluster headache, migraine, brain lesion, idiopathic intracranial hypertension.    - Since patient has not had any similar headaches in the past and there are some atypical symptoms including pain with eye movements as well as pain that has not improved over multiple days I will obtain head CT.   - I will also start treatment with sumatriptan for possible migraine.  The  recommends withholding breast-feeding for 12 hours after dose.  Patient was counseled that breast pain and decreased milk production have been reported as side effects.  -     CT Head With & Without Contrast; Future  -     SUMAtriptan (Imitrex) 50 MG tablet; Take one tablet at onset of headache. May repeat dose one time in 2 hours if headache not relieved.  Dispense: 30 tablet; Refill: 1      Follow Up  No follow-ups on file.    Deb Pressley MD   [facility] [place] [id number]

## 2024-10-29 ENCOUNTER — HOSPITAL ENCOUNTER (OUTPATIENT)
Dept: CT IMAGING | Facility: HOSPITAL | Age: 39
Discharge: HOME | End: 2024-10-29
Admitting: STUDENT IN AN ORGANIZED HEALTH CARE EDUCATION/TRAINING PROGRAM
Payer: COMMERCIAL

## 2024-10-29 DIAGNOSIS — G44.001 INTRACTABLE CLUSTER HEADACHE SYNDROME, UNSPECIFIED CHRONICITY PATTERN: ICD-10-CM

## 2024-10-29 PROCEDURE — 70470 CT HEAD/BRAIN W/O & W/DYE: CPT

## 2024-10-29 PROCEDURE — 25510000001 IOPAMIDOL PER 1 ML: Performed by: STUDENT IN AN ORGANIZED HEALTH CARE EDUCATION/TRAINING PROGRAM

## 2024-10-29 RX ORDER — IOPAMIDOL 755 MG/ML
50 INJECTION, SOLUTION INTRAVASCULAR
Status: COMPLETED | OUTPATIENT
Start: 2024-10-29 | End: 2024-10-29

## 2024-10-29 RX ADMIN — IOPAMIDOL 50 ML: 755 INJECTION, SOLUTION INTRAVENOUS at 08:47

## 2025-01-22 ENCOUNTER — OFFICE VISIT (OUTPATIENT)
Dept: INTERNAL MEDICINE | Facility: CLINIC | Age: 40
End: 2025-01-22
Payer: COMMERCIAL

## 2025-01-22 VITALS
BODY MASS INDEX: 20.31 KG/M2 | WEIGHT: 129.4 LBS | TEMPERATURE: 98.9 F | HEIGHT: 67 IN | DIASTOLIC BLOOD PRESSURE: 60 MMHG | SYSTOLIC BLOOD PRESSURE: 126 MMHG | OXYGEN SATURATION: 98 % | HEART RATE: 73 BPM

## 2025-01-22 DIAGNOSIS — M67.441 GANGLION CYST OF FINGER OF RIGHT HAND: ICD-10-CM

## 2025-01-22 DIAGNOSIS — K58.0 IRRITABLE BOWEL SYNDROME WITH DIARRHEA: Primary | ICD-10-CM

## 2025-01-22 PROCEDURE — 99214 OFFICE O/P EST MOD 30 MIN: CPT | Performed by: INTERNAL MEDICINE

## 2025-01-22 RX ORDER — DICYCLOMINE HYDROCHLORIDE 10 MG/1
10 CAPSULE ORAL
Qty: 60 CAPSULE | Refills: 1 | Status: SHIPPED | OUTPATIENT
Start: 2025-01-22

## 2025-01-22 NOTE — PROGRESS NOTES
[facility]     Latosha Romero  1985   [id number]      Patient Care Team:  David Zaldivar MD as PCP - General (Internal Medicine)    Chief Complaint::   Chief Complaint   Patient presents with    Abdominal Cramping    Diarrhea    Hand Pain     Right hand, under middle finger.         HPI  History of Present Illness  The patient is a 39-year-old female who presents with complaints of abdominal cramping and diarrhea.    She reports experiencing intermittent episodes of abdominal cramping and diarrhea, typically occurring 3 to 4 times within a 30-minute period before subsiding. These symptoms have been present since she ceased breastfeeding in early December 2024, coinciding with the return of her menstrual cycle. She has not experienced any weight loss. Her dietary habits remain unchanged, with a preference for bland foods such as steak and chicken, which she prepares at home five nights a week. She has been avoiding dairy products since college, although she occasionally consumes cheese on pizza. She has a history of gastrointestinal issues, including a colonoscopy in 2011, which did not reveal any abnormalities. She used to take Bentyl for symptom management but has not done so in recent years.    She also reports the development of a small, ball-like mass on her hand around Jaime, which causes pain during certain activities such as lifting a baby seat or gripping objects. She is left-handed and has not noticed any increase in the size of the mass.    FAMILY HISTORY  Her father has a history of gastrointestinal issues. Her mother started having gastrointestinal trouble about 10 years ago.    MEDICATIONS  Past: Bentyl      Chronic Conditions:      Patient Active Problem List   Diagnosis    Irritable bowel syndrome    Allergic rhinitis    Annual physical exam    Paroxysmal tachycardia, unspecified        Past Medical History:   Diagnosis Date    Mastitis, left, acute 9/30/2019     "Scoliosis        Past Surgical History:   Procedure Laterality Date    NOSE SURGERY  2004    TONSILLECTOMY  2003       Family History   Problem Relation Age of Onset    Depression Father     Hypertension Father     Hyperlipidemia Father     Diabetes Paternal Grandfather        Social History     Socioeconomic History    Marital status:    Tobacco Use    Smoking status: Never     Passive exposure: Never    Smokeless tobacco: Never   Vaping Use    Vaping status: Never Used   Substance and Sexual Activity    Alcohol use: No    Drug use: No    Sexual activity: Yes     Partners: Male       Allergies   Allergen Reactions    Cefprozil Hives and Unknown - High Severity     Cefzil    Other Unknown - High Severity     Cefzil - Unknown         Current Outpatient Medications:     PRENATAL VIT-FE FUMARATE-FA PO, Take 1 tablet by mouth Daily., Disp: , Rfl:     SUMAtriptan (Imitrex) 50 MG tablet, Take one tablet at onset of headache. May repeat dose one time in 2 hours if headache not relieved., Disp: 30 tablet, Rfl: 1    dicyclomine (BENTYL) 10 MG capsule, Take 1 capsule by mouth 4 (Four) Times a Day Before Meals & at Bedtime., Disp: 60 capsule, Rfl: 1    Review of Systems   Constitutional: Negative.    Respiratory: Negative.  Negative for chest tightness and shortness of breath.    Cardiovascular: Negative.  Negative for chest pain.   Gastrointestinal:  Positive for abdominal pain and diarrhea. Negative for blood in stool and constipation.        Vital Signs  Vitals:    01/22/25 1023   BP: 126/60   BP Location: Left arm   Patient Position: Sitting   Cuff Size: Adult   Pulse: 73   Temp: 98.9 °F (37.2 °C)   TempSrc: Infrared   SpO2: 98%   Weight: 58.7 kg (129 lb 6.4 oz)   Height: 170.2 cm (67.01\")   PainSc:   5   PainLoc: Abdomen       Physical Exam  Vitals reviewed.   Constitutional:       Appearance: Normal appearance. She is well-developed.   HENT:      Head: Normocephalic and atraumatic.   Neck:      Thyroid: No " thyromegaly.   Cardiovascular:      Rate and Rhythm: Normal rate and regular rhythm.      Heart sounds: Normal heart sounds. No murmur heard.     No friction rub. No gallop.   Pulmonary:      Effort: Pulmonary effort is normal.      Breath sounds: Normal breath sounds.   Abdominal:      General: Bowel sounds are normal.      Palpations: Abdomen is soft. There is no mass.      Tenderness: There is no abdominal tenderness. There is no guarding or rebound.   Musculoskeletal:      Cervical back: Normal range of motion and neck supple.   Neurological:      Mental Status: She is alert.        Physical Exam      Procedures    ACE III MINI        Results             Assessment/Plan:    Diagnoses and all orders for this visit:    1. Irritable bowel syndrome with diarrhea (Primary)    2. Ganglion cyst of finger of right hand    Other orders  -     dicyclomine (BENTYL) 10 MG capsule; Take 1 capsule by mouth 4 (Four) Times a Day Before Meals & at Bedtime.  Dispense: 60 capsule; Refill: 1      Assessment & Plan  1. Irritable bowel syndrome.  This condition may have been reactivated by hormonal changes associated with the cessation of breastfeeding and the resumption of the menstrual cycle. There are no alarming symptoms such as bleeding or weight loss. Empirical treatment with dicyclomine 20 mg as needed will be initiated. She is advised to take it before meals and at bedtime to see if it helps manage the symptoms. If symptoms persist beyond the next 6 to 8 weeks, a referral for a colonoscopy will be considered.    2. Ganglion cyst.  The cyst is located in a challenging position at the palmar aspect of the right third MCP joint. Due to its location, draining it is not recommended. She is advised to monitor the cyst and contact if it becomes more bothersome. If she decides to pursue treatment, a referral to hand surgery will be made.    Follow-up  The patient will follow up as previously scheduled.    PROCEDURE  Colonoscopy in  2011 did not reveal any abnormalities.      Plan of care reviewed with patient at the conclusion of today's visit. Education was provided regarding diagnosis, management, and any prescribed or recommended OTC medications.Patient verbalizes understanding of and agreement with management plan.     Patient or patient representative verbalized consent for the use of Ambient Listening during the visit with  David Zaldivar MD for chart documentation. 1/24/2025  12:38 EST        David Zaldivar MD

## 2025-02-03 ENCOUNTER — TELEPHONE (OUTPATIENT)
Dept: INTERNAL MEDICINE | Facility: CLINIC | Age: 40
End: 2025-02-03

## 2025-02-03 DIAGNOSIS — Z00.00 ANNUAL PHYSICAL EXAM: Primary | ICD-10-CM

## 2025-02-03 NOTE — TELEPHONE ENCOUNTER
Caller: Latosha Romero    Relationship: Self    Best call back number: [phone]    What orders are you requesting (i.e. lab or imaging): PHYSICAL LABS     In what timeframe would the patient need to come in: BEFORE 4/4/2025     Where will you receive your lab/imaging services: IN OFFICE     Additional notes: PATIENT IS WANTING LAB ORDERS FOR PHYSICAL

## 2025-02-03 NOTE — TELEPHONE ENCOUNTER
Spoke to pt and advised her PCP is [facility]OO this week. He will place lab orders when he gets back. Pt can have these drawn before her annual on 4/4/25.

## 2025-03-31 ENCOUNTER — LAB (OUTPATIENT)
Dept: LAB | Facility: HOSPITAL | Age: 40
End: 2025-03-31
Payer: COMMERCIAL

## 2025-03-31 DIAGNOSIS — Z00.00 ANNUAL PHYSICAL EXAM: ICD-10-CM

## 2025-03-31 LAB
ALBUMIN SERPL-MCNC: 4.7 G/DL (ref 3.5–5.2)
ALBUMIN/GLOB SERPL: 1.6 G/DL
ALP SERPL-CCNC: 56 U/L (ref 39–117)
ALT SERPL W P-5'-P-CCNC: 13 U/L (ref 1–33)
ANION GAP SERPL CALCULATED.3IONS-SCNC: 14.8 MMOL/L (ref 5–15)
AST SERPL-CCNC: 19 U/L (ref 1–32)
BASOPHILS # BLD AUTO: 0.05 10*3/MM3 (ref 0–0.2)
BASOPHILS NFR BLD AUTO: 0.7 % (ref 0–1.5)
BILIRUB SERPL-MCNC: 0.7 MG/DL (ref 0–1.2)
BUN SERPL-MCNC: 12 MG/DL (ref 6–20)
BUN/CREAT SERPL: 14.1 (ref 7–25)
CALCIUM SPEC-SCNC: 9.7 MG/DL (ref 8.6–10.5)
CHLORIDE SERPL-SCNC: 104 MMOL/L (ref 98–107)
CHOLEST SERPL-MCNC: 136 MG/DL (ref 0–200)
CO2 SERPL-SCNC: 24.2 MMOL/L (ref 22–29)
CREAT SERPL-MCNC: 0.85 MG/DL (ref 0.57–1)
DEPRECATED RDW RBC AUTO: 41.4 FL (ref 37–54)
EGFRCR SERPLBLD CKD-EPI 2021: 88.9 ML/MIN/1.73
EOSINOPHIL # BLD AUTO: 0.18 10*3/MM3 (ref 0–0.4)
EOSINOPHIL NFR BLD AUTO: 2.4 % (ref 0.3–6.2)
ERYTHROCYTE [DISTWIDTH] IN BLOOD BY AUTOMATED COUNT: 12.5 % (ref 12.3–15.4)
GLOBULIN UR ELPH-MCNC: 3 GM/DL
GLUCOSE SERPL-MCNC: 92 MG/DL (ref 65–99)
HCT VFR BLD AUTO: 42.3 % (ref 34–46.6)
HDLC SERPL-MCNC: 55 MG/DL (ref 40–60)
HGB BLD-MCNC: 13.7 G/DL (ref 12–15.9)
IMM GRANULOCYTES # BLD AUTO: 0.02 10*3/MM3 (ref 0–0.05)
IMM GRANULOCYTES NFR BLD AUTO: 0.3 % (ref 0–0.5)
LDLC SERPL CALC-MCNC: 68 MG/DL (ref 0–100)
LDLC/HDLC SERPL: 1.25 {RATIO}
LYMPHOCYTES # BLD AUTO: 2.56 10*3/MM3 (ref 0.7–3.1)
LYMPHOCYTES NFR BLD AUTO: 33.6 % (ref 19.6–45.3)
MCH RBC QN AUTO: 29.6 PG (ref 26.6–33)
MCHC RBC AUTO-ENTMCNC: 32.4 G/DL (ref 31.5–35.7)
MCV RBC AUTO: 91.4 FL (ref 79–97)
MONOCYTES # BLD AUTO: 0.68 10*3/MM3 (ref 0.1–0.9)
MONOCYTES NFR BLD AUTO: 8.9 % (ref 5–12)
NEUTROPHILS NFR BLD AUTO: 4.13 10*3/MM3 (ref 1.7–7)
NEUTROPHILS NFR BLD AUTO: 54.1 % (ref 42.7–76)
NRBC BLD AUTO-RTO: 0 /100 WBC (ref 0–0.2)
PLATELET # BLD AUTO: 302 10*3/MM3 (ref 140–450)
PMV BLD AUTO: 10.8 FL (ref 6–12)
POTASSIUM SERPL-SCNC: 3.8 MMOL/L (ref 3.5–5.2)
PROT SERPL-MCNC: 7.7 G/DL (ref 6–8.5)
RBC # BLD AUTO: 4.63 10*6/MM3 (ref 3.77–5.28)
SODIUM SERPL-SCNC: 143 MMOL/L (ref 136–145)
T4 FREE SERPL-MCNC: 1.44 NG/DL (ref 0.92–1.68)
TRIGL SERPL-MCNC: 62 MG/DL (ref 0–150)
TSH SERPL DL<=0.05 MIU/L-ACNC: 1.35 UIU/ML (ref 0.27–4.2)
VLDLC SERPL-MCNC: 13 MG/DL (ref 5–40)
WBC NRBC COR # BLD AUTO: 7.62 10*3/MM3 (ref 3.4–10.8)

## 2025-03-31 PROCEDURE — 80053 COMPREHEN METABOLIC PANEL: CPT

## 2025-03-31 PROCEDURE — 85025 COMPLETE CBC W/AUTO DIFF WBC: CPT

## 2025-03-31 PROCEDURE — 84443 ASSAY THYROID STIM HORMONE: CPT

## 2025-03-31 PROCEDURE — 84439 ASSAY OF FREE THYROXINE: CPT

## 2025-03-31 PROCEDURE — 80061 LIPID PANEL: CPT

## 2025-04-04 ENCOUNTER — OFFICE VISIT (OUTPATIENT)
Dept: INTERNAL MEDICINE | Facility: CLINIC | Age: 40
End: 2025-04-04
Payer: COMMERCIAL

## 2025-04-04 VITALS
HEART RATE: 76 BPM | TEMPERATURE: 98.6 F | SYSTOLIC BLOOD PRESSURE: 124 MMHG | HEIGHT: 67 IN | BODY MASS INDEX: 20.25 KG/M2 | OXYGEN SATURATION: 97 % | DIASTOLIC BLOOD PRESSURE: 70 MMHG | WEIGHT: 129 LBS

## 2025-04-04 DIAGNOSIS — Z00.00 ANNUAL PHYSICAL EXAM: Primary | ICD-10-CM

## 2025-04-04 DIAGNOSIS — K58.0 IRRITABLE BOWEL SYNDROME WITH DIARRHEA: ICD-10-CM

## 2025-04-04 PROCEDURE — 99396 PREV VISIT EST AGE 40-64: CPT | Performed by: INTERNAL MEDICINE

## 2025-04-04 NOTE — PROGRESS NOTES
[facility]     Latosha Romero  1985   [id number]      Patient Care Team:  David Zaldivar MD as PCP - General (Internal Medicine)    Chief Complaint::   Chief Complaint   Patient presents with    Annual Exam    Irritable Bowel Syndrome        HPI  History of Present Illness  The patient is a 40-year-old female who presents for an annual examination and follow-up of irritable bowel syndrome.    She reports experiencing abdominal discomfort, which she attributes to her menstrual cycle. She experiences diarrhea during her menstrual cycle. She has not identified any specific dietary triggers for her symptoms. She maintains a bland diet and avoids dairy products, although she occasionally consumes cheese. She has not undergone any food allergy testing. Despite her symptoms, she does not experience significant weight loss. She does not take Imodium for diarrhea. She has found relief with Bentyl, which she was prescribed during her last visit and has taken a few times.    She has expressed interest in undergoing cholesterol and vitamin D level tests. She is also considering a bone density test due to her mother's history of osteoporosis. She is scheduled for a mammogram in 06/2025 at  but has been advised to postpone it for 6 months due to breastfeeding.    Supplemental Information  She has a cyst in her hand, which occasionally causes pain when she picks something up. She does not think it has gotten any bigger. She saw Dr. Crandall at [facility], who told her that if it bothers her too much, they can remove it in the office.    FAMILY HISTORY  Her mother developed osteoporosis at a young age.    MEDICATIONS  Bentyl    Chronic Conditions:      Patient Active Problem List   Diagnosis    Irritable bowel syndrome    Allergic rhinitis    Annual physical exam    Paroxysmal tachycardia, unspecified        Past Medical History:   Diagnosis Date    Mastitis, left, acute 9/30/2019    Scoliosis         Past Surgical History:   Procedure Laterality Date    NOSE SURGERY  2004    TONSILLECTOMY  2003       Family History   Problem Relation Age of Onset    Depression Father     Hypertension Father     Hyperlipidemia Father     Diabetes Paternal Grandfather        Social History     Socioeconomic History    Marital status:    Tobacco Use    Smoking status: Never     Passive exposure: Never    Smokeless tobacco: Never   Vaping Use    Vaping status: Never Used   Substance and Sexual Activity    Alcohol use: No    Drug use: No    Sexual activity: Yes     Partners: Male       Allergies   Allergen Reactions    Cefprozil Hives and Unknown - High Severity     Cefzil    Other Unknown - High Severity     Cefzil - Unknown         Current Outpatient Medications:     dicyclomine (BENTYL) 10 MG capsule, Take 1 capsule by mouth 4 (Four) Times a Day Before Meals & at Bedtime., Disp: 60 capsule, Rfl: 1    Immunization History   Administered Date(s) Administered    COVID-19 (PFIZER) Purple Cap Monovalent 12/30/2020, 01/20/2021, 12/21/2021    Flu Vaccine Quad PF >36MO 10/18/2017, 10/16/2018, 10/15/2019    Fluzone (or Fluarix & Flulaval for VFC) >6mos 10/21/2022    Fluzone Quad >6mos (Multi-dose) 11/30/2021    Hepatitis B Adult/Adolescent IM 02/29/1996, 04/01/1996, 09/05/1996    Influenza TIV (IM) 11/02/2015    Influenza, Unspecified 10/19/2011, 10/17/2012, 10/17/2013, 10/12/2014, 11/07/2015, 10/11/2016, 09/27/2017, 10/15/2018, 11/01/2019, 10/11/2020    MMR 05/14/1986, 02/29/1996    PPD Test 09/09/2003, 09/30/2003, 04/20/2011, 05/04/2011, 01/11/2012, 01/11/2013, 01/06/2014    Pneumococcal Polysaccharide (PPSV23) 09/28/2017    Tdap 01/06/2009, 02/24/2016, 10/29/2018, 08/21/2023        Health Maintenance Due   Topic Date Due    PAP SMEAR  Never done    COVID-19 Vaccine (4 - 2024-25 season) 09/01/2024    ANNUAL PHYSICAL  03/06/2025    MAMMOGRAM  01/23/2025        Review of Systems   Constitutional: Negative.    Respiratory:  "Negative.  Negative for chest tightness and shortness of breath.    Cardiovascular: Negative.  Negative for chest pain.   Gastrointestinal:  Negative for abdominal pain, blood in stool, constipation and diarrhea.        Vital Signs  Vitals:    04/04/25 1030   BP: 124/70   BP Location: Left arm   Patient Position: Sitting   Cuff Size: Adult   Pulse: 76   Temp: 98.6 °F (37 °C)   TempSrc: Infrared   SpO2: 97%   Weight: 58.5 kg (129 lb)   Height: 170.2 cm (67.01\")   PainSc: 0-No pain       Physical Exam  Vitals reviewed.   Constitutional:       Appearance: Normal appearance. She is well-developed.   HENT:      Head: Normocephalic and atraumatic.      Right Ear: Tympanic membrane and ear canal normal.      Left Ear: Tympanic membrane and ear canal normal.      Mouth/Throat:      Pharynx: Oropharynx is clear. No posterior oropharyngeal erythema.   Eyes:      Extraocular Movements: Extraocular movements intact.      Pupils: Pupils are equal, round, and reactive to light.   Neck:      Thyroid: No thyromegaly.      Vascular: No carotid bruit.   Cardiovascular:      Rate and Rhythm: Normal rate and regular rhythm.      Heart sounds: Normal heart sounds. No murmur heard.     No friction rub. No gallop.   Pulmonary:      Effort: Pulmonary effort is normal.      Breath sounds: Normal breath sounds.   Abdominal:      General: Bowel sounds are normal.      Palpations: Abdomen is soft. There is no mass.      Tenderness: There is no abdominal tenderness.   Musculoskeletal:      Cervical back: Normal range of motion and neck supple.      Right lower leg: No edema.      Left lower leg: No edema.   Lymphadenopathy:      Cervical: No cervical adenopathy.   Skin:     General: Skin is warm and dry.   Neurological:      Mental Status: She is alert and oriented to person, place, and time.      Cranial Nerves: No cranial nerve deficit.      Motor: No weakness.      Gait: Gait normal.   Psychiatric:         Mood and Affect: Mood normal.       "   Behavior: Behavior normal.        Physical Exam      Procedures     Fall Risk Screen:  JANNETTE Fall Risk Assessment has not been completed.    Health Habits and Functional and Cognitive Screening:       No data to display                Depression [place]ing  PHQ-9 Total Score:      ACE III MINI             Assessment/Plan:      Assessment & Plan  1. Health maintenance.  She remains in very good health with reasonably good lifestyle habits. Her mother developed osteoporosis at a young age, so the importance of regular weightbearing exercise to preserve bone strength was discussed. She will be due for mammography later this year and sees a gynecologist at the [facility]. Colorectal cancer screening will begin at age 45.    2. Irritable bowel syndrome.  This has been a longstanding issue. Bentyl helps during flare-ups. She has not been able to identify any specific food triggers and had a colonoscopy in 2011 that was negative. She will continue current measures.    Follow-up  The patient will follow up in 1 year.    PROCEDURE  The patient underwent a colonoscopy in 2011, which yielded negative results.      Labs  Results      Counseling was given to patient for the following topics: appropriate exercise 150 minutes per week, disease prevention, and healthy eating habits.    Plan of care reviewed with patient at the conclusion of today's visit. Education was provided regarding diagnosis, management, and any prescribed or recommended OTC medications.Patient verbalizes understanding of and agreement with management plan.     Patient or patient representative verbalized consent for the use of Ambient Listening during the visit with  David Zaldivar MD for chart documentation. 4/7/2025  21:13 EDT        David Zaldivar MD

## 2025-05-05 DIAGNOSIS — Z12.31 ENCOUNTER FOR SCREENING MAMMOGRAM FOR MALIGNANT NEOPLASM OF BREAST: Primary | ICD-10-CM

## 2025-06-01 LAB
NCCN CRITERIA FLAG: ABNORMAL
TYRER CUZICK SCORE: 22.6

## 2025-06-02 ENCOUNTER — HOSPITAL ENCOUNTER (OUTPATIENT)
Dept: MAMMOGRAPHY | Facility: HOSPITAL | Age: 40
Discharge: HOME | End: 2025-06-02
Admitting: INTERNAL MEDICINE
Payer: COMMERCIAL

## 2025-06-02 DIAGNOSIS — Z12.31 ENCOUNTER FOR SCREENING MAMMOGRAM FOR MALIGNANT NEOPLASM OF BREAST: ICD-10-CM

## 2025-06-02 PROCEDURE — 77063 BREAST TOMOSYNTHESIS BI: CPT

## 2025-06-02 PROCEDURE — 77067 SCR MAMMO BI INCL CAD: CPT

## 2025-06-03 ENCOUNTER — RESULTS FOLLOW-UP (OUTPATIENT)
Facility: HOSPITAL | Age: 40
End: 2025-06-03
Payer: COMMERCIAL

## 2025-06-03 ENCOUNTER — DOCUMENTATION (OUTPATIENT)
Dept: GENETICS | Facility: HOSPITAL | Age: 40
End: 2025-06-03
Payer: COMMERCIAL

## 2025-06-03 NOTE — PROGRESS NOTES
This patient recently completed the CARE risk assessment for a mammogram appointment. Based on the patient's responses, the Tyrer-Cuzick breast cancer risk score is > 20.    Navigator follow-up:    I spoke with the patient about the recommendations and options for risk management for elevated Tyrer-Cuzick risk scores.  The patient has declined a referral for risk management.  I encouraged her to discuss management options with her provider.    oral

## 2025-06-03 NOTE — PROGRESS NOTES
Meets NCCN Criteria for Genetic Testing  Declines genetic testing?     Reason for decline?     If Reason for Decline is Previous Testing    [facility] CARE     Non-CARE     Non-CARE Confirmation    Navigator Confirmed?     Patient Reported?     Elevated Tyrer-Cuzick Score ? Or Equal to 20%    Declines referral? Y   Reason for decline? Personal Choice

## 2025-06-11 ENCOUNTER — HOSPITAL ENCOUNTER (OUTPATIENT)
Dept: ULTRASOUND IMAGING | Facility: HOSPITAL | Age: 40
Discharge: HOME | End: 2025-06-11
Payer: COMMERCIAL

## 2025-06-11 ENCOUNTER — HOSPITAL ENCOUNTER (OUTPATIENT)
Dept: MAMMOGRAPHY | Facility: HOSPITAL | Age: 40
Discharge: HOME | End: 2025-06-11
Payer: COMMERCIAL

## 2025-06-11 DIAGNOSIS — R92.8 ABNORMAL MAMMOGRAM: ICD-10-CM

## 2025-06-11 PROCEDURE — 77066 DX MAMMO INCL CAD BI: CPT

## 2025-06-11 PROCEDURE — G0279 TOMOSYNTHESIS, MAMMO: HCPCS

## 2025-06-11 PROCEDURE — 76642 ULTRASOUND BREAST LIMITED: CPT

## 2025-06-18 DIAGNOSIS — Z12.39 BREAST CANCER SCREENING, HIGH RISK PATIENT: Primary | ICD-10-CM
